# Patient Record
Sex: MALE | Race: WHITE | NOT HISPANIC OR LATINO | URBAN - METROPOLITAN AREA
[De-identification: names, ages, dates, MRNs, and addresses within clinical notes are randomized per-mention and may not be internally consistent; named-entity substitution may affect disease eponyms.]

---

## 2017-02-14 ENCOUNTER — OUTPATIENT (OUTPATIENT)
Dept: OUTPATIENT SERVICES | Facility: HOSPITAL | Age: 82
LOS: 1 days | End: 2017-02-14
Payer: MEDICARE

## 2017-02-14 DIAGNOSIS — Z95.5 PRESENCE OF CORONARY ANGIOPLASTY IMPLANT AND GRAFT: Chronic | ICD-10-CM

## 2017-02-14 DIAGNOSIS — H26.9 UNSPECIFIED CATARACT: Chronic | ICD-10-CM

## 2017-02-14 DIAGNOSIS — K40.90 UNILATERAL INGUINAL HERNIA, WITHOUT OBSTRUCTION OR GANGRENE, NOT SPECIFIED AS RECURRENT: Chronic | ICD-10-CM

## 2017-02-14 PROCEDURE — A9585: CPT

## 2017-02-14 PROCEDURE — 74000: CPT

## 2017-02-14 PROCEDURE — 74183 MRI ABD W/O CNTR FLWD CNTR: CPT

## 2017-02-23 ENCOUNTER — APPOINTMENT (OUTPATIENT)
Dept: HEART AND VASCULAR | Facility: CLINIC | Age: 82
End: 2017-02-23

## 2017-02-23 VITALS — HEART RATE: 72 BPM | SYSTOLIC BLOOD PRESSURE: 120 MMHG | DIASTOLIC BLOOD PRESSURE: 80 MMHG | RESPIRATION RATE: 16 BRPM

## 2017-02-23 VITALS — WEIGHT: 197 LBS | HEIGHT: 69 IN | BODY MASS INDEX: 29.18 KG/M2

## 2017-02-23 DIAGNOSIS — E78.00 PURE HYPERCHOLESTEROLEMIA, UNSPECIFIED: ICD-10-CM

## 2017-02-23 DIAGNOSIS — Z00.00 ENCOUNTER FOR GENERAL ADULT MEDICAL EXAMINATION W/OUT ABNORMAL FINDINGS: ICD-10-CM

## 2017-02-23 DIAGNOSIS — Z87.891 PERSONAL HISTORY OF NICOTINE DEPENDENCE: ICD-10-CM

## 2017-02-23 DIAGNOSIS — E11.9 TYPE 2 DIABETES MELLITUS W/OUT COMPLICATIONS: ICD-10-CM

## 2017-02-23 DIAGNOSIS — M10.9 GOUT, UNSPECIFIED: ICD-10-CM

## 2017-02-23 DIAGNOSIS — R07.9 CHEST PAIN, UNSPECIFIED: ICD-10-CM

## 2017-02-23 DIAGNOSIS — I10 ESSENTIAL (PRIMARY) HYPERTENSION: ICD-10-CM

## 2017-02-23 RX ORDER — METFORMIN HYDROCHLORIDE 500 MG/1
500 TABLET, COATED ORAL
Refills: 0 | Status: ACTIVE | COMMUNITY

## 2017-02-23 RX ORDER — ROSUVASTATIN CALCIUM 5 MG/1
5 TABLET, FILM COATED ORAL
Refills: 0 | Status: ACTIVE | COMMUNITY

## 2017-02-23 RX ORDER — BISMUTH SUBSALICYLATE 525 MG/1
TABLET ORAL
Refills: 0 | Status: ACTIVE | COMMUNITY

## 2017-02-23 RX ORDER — CHOLECALCIFEROL (VITAMIN D3) 25 MCG
TABLET ORAL
Refills: 0 | Status: ACTIVE | COMMUNITY

## 2017-02-23 RX ORDER — COLCHICINE 0.6 MG/1
0.6 CAPSULE ORAL
Refills: 0 | Status: ACTIVE | COMMUNITY

## 2017-02-23 RX ORDER — CLOPIDOGREL 75 MG/1
75 TABLET, FILM COATED ORAL
Refills: 0 | Status: ACTIVE | COMMUNITY

## 2017-02-23 RX ORDER — AMLODIPINE BESYLATE 5 MG/1
TABLET ORAL
Refills: 0 | Status: ACTIVE | COMMUNITY

## 2017-02-23 RX ORDER — TAMSULOSIN HYDROCHLORIDE 0.4 MG/1
0.4 CAPSULE ORAL
Refills: 0 | Status: ACTIVE | COMMUNITY

## 2017-02-23 RX ORDER — PANTOPRAZOLE SODIUM 40 MG/10ML
40 INJECTION, POWDER, FOR SOLUTION INTRAVENOUS
Refills: 0 | Status: ACTIVE | COMMUNITY

## 2017-02-23 RX ORDER — FUROSEMIDE 40 MG/1
40 TABLET ORAL
Refills: 0 | Status: ACTIVE | COMMUNITY

## 2017-02-23 RX ORDER — LOSARTAN POTASSIUM 100 MG/1
100 TABLET, FILM COATED ORAL
Refills: 0 | Status: ACTIVE | COMMUNITY

## 2017-03-01 ENCOUNTER — FORM ENCOUNTER (OUTPATIENT)
Age: 82
End: 2017-03-01

## 2017-03-02 ENCOUNTER — OUTPATIENT (OUTPATIENT)
Dept: OUTPATIENT SERVICES | Facility: HOSPITAL | Age: 82
LOS: 1 days | End: 2017-03-02
Payer: MEDICARE

## 2017-03-02 DIAGNOSIS — Z95.5 PRESENCE OF CORONARY ANGIOPLASTY IMPLANT AND GRAFT: Chronic | ICD-10-CM

## 2017-03-02 DIAGNOSIS — K40.90 UNILATERAL INGUINAL HERNIA, WITHOUT OBSTRUCTION OR GANGRENE, NOT SPECIFIED AS RECURRENT: Chronic | ICD-10-CM

## 2017-03-02 DIAGNOSIS — H26.9 UNSPECIFIED CATARACT: Chronic | ICD-10-CM

## 2017-03-02 PROCEDURE — 75574 CT ANGIO HRT W/3D IMAGE: CPT | Mod: 26

## 2017-03-02 PROCEDURE — 75574 CT ANGIO HRT W/3D IMAGE: CPT

## 2019-01-04 ENCOUNTER — OUTPATIENT (OUTPATIENT)
Dept: OUTPATIENT SERVICES | Facility: HOSPITAL | Age: 84
LOS: 1 days | End: 2019-01-04
Payer: MEDICARE

## 2019-01-04 DIAGNOSIS — K40.90 UNILATERAL INGUINAL HERNIA, WITHOUT OBSTRUCTION OR GANGRENE, NOT SPECIFIED AS RECURRENT: Chronic | ICD-10-CM

## 2019-01-04 DIAGNOSIS — I25.10 ATHEROSCLEROTIC HEART DISEASE OF NATIVE CORONARY ARTERY WITHOUT ANGINA PECTORIS: ICD-10-CM

## 2019-01-04 DIAGNOSIS — H26.9 UNSPECIFIED CATARACT: Chronic | ICD-10-CM

## 2019-01-04 DIAGNOSIS — Z95.5 PRESENCE OF CORONARY ANGIOPLASTY IMPLANT AND GRAFT: Chronic | ICD-10-CM

## 2019-01-04 PROCEDURE — 78452 HT MUSCLE IMAGE SPECT MULT: CPT

## 2019-01-04 PROCEDURE — A9500: CPT

## 2019-01-04 PROCEDURE — 93306 TTE W/DOPPLER COMPLETE: CPT

## 2019-01-04 PROCEDURE — 93018 CV STRESS TEST I&R ONLY: CPT

## 2019-01-04 PROCEDURE — 93016 CV STRESS TEST SUPVJ ONLY: CPT

## 2019-01-04 PROCEDURE — 78452 HT MUSCLE IMAGE SPECT MULT: CPT | Mod: 26

## 2019-01-04 PROCEDURE — 93017 CV STRESS TEST TRACING ONLY: CPT

## 2019-01-04 PROCEDURE — A9505: CPT

## 2019-01-04 PROCEDURE — 93306 TTE W/DOPPLER COMPLETE: CPT | Mod: 26

## 2020-01-24 ENCOUNTER — INPATIENT (INPATIENT)
Facility: HOSPITAL | Age: 85
LOS: 1 days | Discharge: HOME | DRG: 395 | End: 2020-01-26
Attending: SURGERY | Admitting: SURGERY
Payer: MEDICARE

## 2020-01-24 VITALS
WEIGHT: 205.91 LBS | HEART RATE: 95 BPM | DIASTOLIC BLOOD PRESSURE: 82 MMHG | TEMPERATURE: 98 F | OXYGEN SATURATION: 94 % | RESPIRATION RATE: 18 BRPM | SYSTOLIC BLOOD PRESSURE: 162 MMHG

## 2020-01-24 DIAGNOSIS — Z95.5 PRESENCE OF CORONARY ANGIOPLASTY IMPLANT AND GRAFT: Chronic | ICD-10-CM

## 2020-01-24 DIAGNOSIS — H26.9 UNSPECIFIED CATARACT: Chronic | ICD-10-CM

## 2020-01-24 DIAGNOSIS — K40.90 UNILATERAL INGUINAL HERNIA, WITHOUT OBSTRUCTION OR GANGRENE, NOT SPECIFIED AS RECURRENT: Chronic | ICD-10-CM

## 2020-01-24 LAB
ALBUMIN SERPL ELPH-MCNC: 4.2 G/DL — SIGNIFICANT CHANGE UP (ref 3.3–5)
ALP SERPL-CCNC: 65 U/L — SIGNIFICANT CHANGE UP (ref 40–120)
ALT FLD-CCNC: 16 U/L — SIGNIFICANT CHANGE UP (ref 10–45)
ANION GAP SERPL CALC-SCNC: 12 MMOL/L — SIGNIFICANT CHANGE UP (ref 5–17)
APPEARANCE UR: CLEAR — SIGNIFICANT CHANGE UP
APTT BLD: 26.3 SEC — LOW (ref 27.5–36.3)
AST SERPL-CCNC: 22 U/L — SIGNIFICANT CHANGE UP (ref 10–40)
BASOPHILS # BLD AUTO: 0.03 K/UL — SIGNIFICANT CHANGE UP (ref 0–0.2)
BASOPHILS NFR BLD AUTO: 0.4 % — SIGNIFICANT CHANGE UP (ref 0–2)
BILIRUB SERPL-MCNC: 0.9 MG/DL — SIGNIFICANT CHANGE UP (ref 0.2–1.2)
BILIRUB UR-MCNC: NEGATIVE — SIGNIFICANT CHANGE UP
BLD GP AB SCN SERPL QL: NEGATIVE — SIGNIFICANT CHANGE UP
BUN SERPL-MCNC: 15 MG/DL — SIGNIFICANT CHANGE UP (ref 7–23)
CALCIUM SERPL-MCNC: 9.1 MG/DL — SIGNIFICANT CHANGE UP (ref 8.4–10.5)
CHLORIDE SERPL-SCNC: 100 MMOL/L — SIGNIFICANT CHANGE UP (ref 96–108)
CO2 SERPL-SCNC: 23 MMOL/L — SIGNIFICANT CHANGE UP (ref 22–31)
COLOR SPEC: YELLOW — SIGNIFICANT CHANGE UP
CREAT SERPL-MCNC: 0.92 MG/DL — SIGNIFICANT CHANGE UP (ref 0.5–1.3)
DIFF PNL FLD: NEGATIVE — SIGNIFICANT CHANGE UP
EOSINOPHIL # BLD AUTO: 0.07 K/UL — SIGNIFICANT CHANGE UP (ref 0–0.5)
EOSINOPHIL NFR BLD AUTO: 0.9 % — SIGNIFICANT CHANGE UP (ref 0–6)
GLUCOSE BLDC GLUCOMTR-MCNC: 97 MG/DL — SIGNIFICANT CHANGE UP (ref 70–99)
GLUCOSE SERPL-MCNC: 114 MG/DL — HIGH (ref 70–99)
GLUCOSE UR QL: NEGATIVE — SIGNIFICANT CHANGE UP
HCT VFR BLD CALC: 39.6 % — SIGNIFICANT CHANGE UP (ref 39–50)
HCT VFR BLD CALC: 41.4 % — SIGNIFICANT CHANGE UP (ref 39–50)
HGB BLD-MCNC: 13.1 G/DL — SIGNIFICANT CHANGE UP (ref 13–17)
HGB BLD-MCNC: 13.6 G/DL — SIGNIFICANT CHANGE UP (ref 13–17)
IMM GRANULOCYTES NFR BLD AUTO: 0.3 % — SIGNIFICANT CHANGE UP (ref 0–1.5)
INR BLD: 1.16 — SIGNIFICANT CHANGE UP (ref 0.88–1.16)
KETONES UR-MCNC: NEGATIVE — SIGNIFICANT CHANGE UP
LACTATE SERPL-SCNC: 1.9 MMOL/L — SIGNIFICANT CHANGE UP (ref 0.5–2)
LEUKOCYTE ESTERASE UR-ACNC: NEGATIVE — SIGNIFICANT CHANGE UP
LYMPHOCYTES # BLD AUTO: 0.59 K/UL — LOW (ref 1–3.3)
LYMPHOCYTES # BLD AUTO: 8 % — LOW (ref 13–44)
MCHC RBC-ENTMCNC: 28.4 PG — SIGNIFICANT CHANGE UP (ref 27–34)
MCHC RBC-ENTMCNC: 28.4 PG — SIGNIFICANT CHANGE UP (ref 27–34)
MCHC RBC-ENTMCNC: 32.9 GM/DL — SIGNIFICANT CHANGE UP (ref 32–36)
MCHC RBC-ENTMCNC: 33.1 GM/DL — SIGNIFICANT CHANGE UP (ref 32–36)
MCV RBC AUTO: 85.7 FL — SIGNIFICANT CHANGE UP (ref 80–100)
MCV RBC AUTO: 86.4 FL — SIGNIFICANT CHANGE UP (ref 80–100)
MONOCYTES # BLD AUTO: 0.66 K/UL — SIGNIFICANT CHANGE UP (ref 0–0.9)
MONOCYTES NFR BLD AUTO: 9 % — SIGNIFICANT CHANGE UP (ref 2–14)
NEUTROPHILS # BLD AUTO: 6 K/UL — SIGNIFICANT CHANGE UP (ref 1.8–7.4)
NEUTROPHILS NFR BLD AUTO: 81.4 % — HIGH (ref 43–77)
NITRITE UR-MCNC: NEGATIVE — SIGNIFICANT CHANGE UP
NRBC # BLD: 0 /100 WBCS — SIGNIFICANT CHANGE UP (ref 0–0)
NRBC # BLD: 0 /100 WBCS — SIGNIFICANT CHANGE UP (ref 0–0)
PH UR: 6.5 — SIGNIFICANT CHANGE UP (ref 5–8)
PLATELET # BLD AUTO: 201 K/UL — SIGNIFICANT CHANGE UP (ref 150–400)
PLATELET # BLD AUTO: 214 K/UL — SIGNIFICANT CHANGE UP (ref 150–400)
POTASSIUM SERPL-MCNC: 4.7 MMOL/L — SIGNIFICANT CHANGE UP (ref 3.5–5.3)
POTASSIUM SERPL-SCNC: 4.7 MMOL/L — SIGNIFICANT CHANGE UP (ref 3.5–5.3)
PROT SERPL-MCNC: 7.3 G/DL — SIGNIFICANT CHANGE UP (ref 6–8.3)
PROT UR-MCNC: NEGATIVE MG/DL — SIGNIFICANT CHANGE UP
PROTHROM AB SERPL-ACNC: 13.3 SEC — HIGH (ref 10–12.9)
RBC # BLD: 4.62 M/UL — SIGNIFICANT CHANGE UP (ref 4.2–5.8)
RBC # BLD: 4.79 M/UL — SIGNIFICANT CHANGE UP (ref 4.2–5.8)
RBC # FLD: 13.7 % — SIGNIFICANT CHANGE UP (ref 10.3–14.5)
RBC # FLD: 13.9 % — SIGNIFICANT CHANGE UP (ref 10.3–14.5)
RH IG SCN BLD-IMP: POSITIVE — SIGNIFICANT CHANGE UP
SODIUM SERPL-SCNC: 135 MMOL/L — SIGNIFICANT CHANGE UP (ref 135–145)
SP GR SPEC: 1.01 — SIGNIFICANT CHANGE UP (ref 1–1.03)
UROBILINOGEN FLD QL: 0.2 E.U./DL — SIGNIFICANT CHANGE UP
WBC # BLD: 7.27 K/UL — SIGNIFICANT CHANGE UP (ref 3.8–10.5)
WBC # BLD: 7.37 K/UL — SIGNIFICANT CHANGE UP (ref 3.8–10.5)
WBC # FLD AUTO: 7.27 K/UL — SIGNIFICANT CHANGE UP (ref 3.8–10.5)
WBC # FLD AUTO: 7.37 K/UL — SIGNIFICANT CHANGE UP (ref 3.8–10.5)

## 2020-01-24 PROCEDURE — 74174 CTA ABD&PLVS W/CONTRAST: CPT | Mod: 26

## 2020-01-24 PROCEDURE — 99285 EMERGENCY DEPT VISIT HI MDM: CPT

## 2020-01-24 RX ORDER — AMLODIPINE BESYLATE 2.5 MG/1
2.5 TABLET ORAL DAILY
Refills: 0 | Status: DISCONTINUED | OUTPATIENT
Start: 2020-01-24 | End: 2020-01-25

## 2020-01-24 RX ORDER — DEXTROSE 50 % IN WATER 50 %
12.5 SYRINGE (ML) INTRAVENOUS ONCE
Refills: 0 | Status: DISCONTINUED | OUTPATIENT
Start: 2020-01-24 | End: 2020-01-26

## 2020-01-24 RX ORDER — HYDROMORPHONE HYDROCHLORIDE 2 MG/ML
0.5 INJECTION INTRAMUSCULAR; INTRAVENOUS; SUBCUTANEOUS EVERY 6 HOURS
Refills: 0 | Status: DISCONTINUED | OUTPATIENT
Start: 2020-01-24 | End: 2020-01-26

## 2020-01-24 RX ORDER — TAMSULOSIN HYDROCHLORIDE 0.4 MG/1
1 CAPSULE ORAL
Qty: 0 | Refills: 0 | DISCHARGE

## 2020-01-24 RX ORDER — LOSARTAN POTASSIUM 100 MG/1
100 TABLET, FILM COATED ORAL DAILY
Refills: 0 | Status: DISCONTINUED | OUTPATIENT
Start: 2020-01-24 | End: 2020-01-26

## 2020-01-24 RX ORDER — DEXTROSE 50 % IN WATER 50 %
15 SYRINGE (ML) INTRAVENOUS ONCE
Refills: 0 | Status: DISCONTINUED | OUTPATIENT
Start: 2020-01-24 | End: 2020-01-26

## 2020-01-24 RX ORDER — PANTOPRAZOLE SODIUM 20 MG/1
40 TABLET, DELAYED RELEASE ORAL DAILY
Refills: 0 | Status: DISCONTINUED | OUTPATIENT
Start: 2020-01-24 | End: 2020-01-26

## 2020-01-24 RX ORDER — DEXTROSE 50 % IN WATER 50 %
25 SYRINGE (ML) INTRAVENOUS ONCE
Refills: 0 | Status: DISCONTINUED | OUTPATIENT
Start: 2020-01-24 | End: 2020-01-26

## 2020-01-24 RX ORDER — LANOLIN ALCOHOL/MO/W.PET/CERES
5 CREAM (GRAM) TOPICAL AT BEDTIME
Refills: 0 | Status: DISCONTINUED | OUTPATIENT
Start: 2020-01-24 | End: 2020-01-26

## 2020-01-24 RX ORDER — SODIUM CHLORIDE 9 MG/ML
1000 INJECTION, SOLUTION INTRAVENOUS
Refills: 0 | Status: DISCONTINUED | OUTPATIENT
Start: 2020-01-24 | End: 2020-01-26

## 2020-01-24 RX ORDER — LOSARTAN POTASSIUM 100 MG/1
1 TABLET, FILM COATED ORAL
Qty: 0 | Refills: 0 | DISCHARGE

## 2020-01-24 RX ORDER — INSULIN LISPRO 100/ML
VIAL (ML) SUBCUTANEOUS
Refills: 0 | Status: DISCONTINUED | OUTPATIENT
Start: 2020-01-24 | End: 2020-01-26

## 2020-01-24 RX ORDER — METRONIDAZOLE 500 MG
500 TABLET ORAL EVERY 8 HOURS
Refills: 0 | Status: DISCONTINUED | OUTPATIENT
Start: 2020-01-24 | End: 2020-01-26

## 2020-01-24 RX ORDER — ONDANSETRON 8 MG/1
4 TABLET, FILM COATED ORAL EVERY 6 HOURS
Refills: 0 | Status: DISCONTINUED | OUTPATIENT
Start: 2020-01-24 | End: 2020-01-26

## 2020-01-24 RX ORDER — CIPROFLOXACIN LACTATE 400MG/40ML
400 VIAL (ML) INTRAVENOUS EVERY 12 HOURS
Refills: 0 | Status: DISCONTINUED | OUTPATIENT
Start: 2020-01-24 | End: 2020-01-26

## 2020-01-24 RX ORDER — LABETALOL HCL 100 MG
10 TABLET ORAL ONCE
Refills: 0 | Status: COMPLETED | OUTPATIENT
Start: 2020-01-24 | End: 2020-01-24

## 2020-01-24 RX ORDER — GLUCAGON INJECTION, SOLUTION 0.5 MG/.1ML
1 INJECTION, SOLUTION SUBCUTANEOUS ONCE
Refills: 0 | Status: DISCONTINUED | OUTPATIENT
Start: 2020-01-24 | End: 2020-01-26

## 2020-01-24 RX ORDER — SODIUM CHLORIDE 9 MG/ML
1000 INJECTION INTRAMUSCULAR; INTRAVENOUS; SUBCUTANEOUS
Refills: 0 | Status: DISCONTINUED | OUTPATIENT
Start: 2020-01-24 | End: 2020-01-26

## 2020-01-24 RX ORDER — METRONIDAZOLE 500 MG
TABLET ORAL
Refills: 0 | Status: DISCONTINUED | OUTPATIENT
Start: 2020-01-24 | End: 2020-01-24

## 2020-01-24 RX ORDER — ACETAMINOPHEN 500 MG
1000 TABLET ORAL ONCE
Refills: 0 | Status: DISCONTINUED | OUTPATIENT
Start: 2020-01-24 | End: 2020-01-26

## 2020-01-24 RX ADMIN — Medication 100 MILLIGRAM(S): at 17:06

## 2020-01-24 RX ADMIN — Medication 10 MILLIGRAM(S): at 19:42

## 2020-01-24 RX ADMIN — SODIUM CHLORIDE 120 MILLILITER(S): 9 INJECTION INTRAMUSCULAR; INTRAVENOUS; SUBCUTANEOUS at 21:27

## 2020-01-24 RX ADMIN — Medication 200 MILLIGRAM(S): at 19:14

## 2020-01-24 RX ADMIN — Medication 5 MILLIGRAM(S): at 21:53

## 2020-01-24 NOTE — ED ADULT NURSE NOTE - PSH
Cataracts, bilateral  prior extraction  Hernia, inguinal, right  repair  Stented coronary artery  2009

## 2020-01-24 NOTE — ED ADULT TRIAGE NOTE - CHIEF COMPLAINT QUOTE
86 y/o male came in for evaluation of rectal bleeding today. 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

## 2020-01-24 NOTE — H&P ADULT - NSHPPHYSICALEXAM_GEN_ALL_CORE
ICU Vital Signs Last 24 Hrs  T(C): 36.8 (24 Jan 2020 13:45), Max: 36.8 (24 Jan 2020 13:45)  T(F): 98.3 (24 Jan 2020 13:45), Max: 98.3 (24 Jan 2020 13:45)  HR: 70 (24 Jan 2020 13:45) (70 - 95)  BP: 187/88 (24 Jan 2020 13:45) (162/82 - 187/88)  BP(mean): --  ABP: --  ABP(mean): --  RR: 18 (24 Jan 2020 13:45) (18 - 18)  SpO2: 94% (24 Jan 2020 13:45) (94% - 94%)    GEN: NAD, awake, eyes open spontaneously  HEENT: NCAT, MMM, Trachea midline, normal conjunctiva, perrl  CHEST/LUNGS: Nonlaboured breathing, CTAB, bilateral breath sounds  CARDIAC: RRR, no m/r/g  ABDOMEN: Soft, Nontender, Nondistended, No rebound/guarding. YURY: Emptying rectum, some blood on finger, no masses or haemorrhoids  MSK: No oedema, no gross deformity of extremities  SKIN: No rashes, no petechiae, no vesicles  NEURO: CN grossly intact, normal coordination, no focal motor or sensory deficits  PSYCH: Alert, appropriate, cooperative, with capacity and insight

## 2020-01-24 NOTE — ED ADULT NURSE NOTE - OBJECTIVE STATEMENT
86 y/o male presents to ED accompanied by wife and daughters reporting diarrhea x5 since last night with associated lower abdominal pain and one episode of bright red rectal bleeding this AM. Denies nausea/vomiting, fevers/chills. With hx ischemic colitis. On aspirin and plavix.

## 2020-01-24 NOTE — CONSULT NOTE ADULT - SUBJECTIVE AND OBJECTIVE BOX
GASTROENTEROLOGY CONSULT NOTE  Patient is an 86yo M with a PMHx of HTN, HLD, DM2, CAD s/p stenting on ASA and Plavix, hx of ischemic colitis in 2014, diverticulosis, presenting with multiple episodes of diarrhea and one episode of hematochezia. Patient/patient's daughter iterate history of increased fiber intake recently leading to multiple bowel movements with four episodes of brown watery diarrhea overnight leading to one episode with scant BRBPR and one episode of hematochezia. There was some left sided cramping which preceded the episode and improved after BM. Patient stated his pain was an 8/10 at that time, now improved. Patient states his symptoms are very similar to his presentation in 2014. Denies fever or chills, chest pain, palpitations, nausea, or vomiting. Last colonoscopy roughly 3 years ago notable only for diverticulosis.    Allergies    penicillin (Anaphylaxis; Angioedema)    Intolerances      Home Medications:  amLODIPine 2.5 mg oral tablet: 1 tab(s) orally once a day (24 Jan 2020 15:24)  aspirin 81 mg oral delayed release tablet: 1 tab(s) orally once a day (24 Jan 2020 15:24)  colchicine: 0.5 milligram(s) orally once a day (24 Jan 2020 15:24)  Crestor 5 mg oral tablet: 1 tab(s) orally once a day (at bedtime) (24 Jan 2020 15:24)  Lasix 40 mg oral tablet: 40 milligram(s) orally 3 times a day (24 Jan 2020 15:24)  losartan 100 mg oral tablet: 1 tab(s) orally once a day (24 Jan 2020 15:24)  metformin 500 mg oral tablet: 1 tab(s) orally once a day (at bedtime) (24 Jan 2020 15:24)  Plavix 75 mg oral tablet: 1 tab(s) orally once a day (24 Jan 2020 15:24)  Protonix 40 mg oral delayed release tablet: 1 tab(s) orally once a day (24 Jan 2020 15:24)  tamsulosin 0.4 mg oral capsule: 1 cap(s) orally once a day (24 Jan 2020 15:24)    MEDICATIONS:  MEDICATIONS  (STANDING):  ciprofloxacin   IVPB 400 milliGRAM(s) IV Intermittent every 12 hours  dextrose 5%. 1000 milliLiter(s) (50 mL/Hr) IV Continuous <Continuous>  dextrose 50% Injectable 12.5 Gram(s) IV Push once  dextrose 50% Injectable 25 Gram(s) IV Push once  dextrose 50% Injectable 25 Gram(s) IV Push once  insulin lispro (HumaLOG) corrective regimen sliding scale   SubCutaneous Before meals and at bedtime  metroNIDAZOLE  IVPB 500 milliGRAM(s) IV Intermittent every 8 hours  sodium chloride 0.9%. 1000 milliLiter(s) (120 mL/Hr) IV Continuous <Continuous>    MEDICATIONS  (PRN):  dextrose 40% Gel 15 Gram(s) Oral once PRN Blood Glucose LESS THAN 70 milliGRAM(s)/deciliter  glucagon  Injectable 1 milliGRAM(s) IntraMuscular once PRN Glucose LESS THAN 70 milligrams/deciliter  HYDROmorphone  Injectable 0.5 milliGRAM(s) IV Push every 6 hours PRN Severe Pain (7 - 10)  ondansetron Injectable 4 milliGRAM(s) IV Push every 6 hours PRN Nausea and/or Vomiting    PAST MEDICAL & SURGICAL HISTORY:  Diverticulitis  Diabetes mellitus type 2, noninsulin dependent  CAD (coronary artery disease)  HTN (hypertension)  Cataracts, bilateral: prior extraction  Hernia, inguinal, right: repair  Stented coronary artery: 2009    FAMILY HISTORY:    SOCIAL HISTORY:  Tobacco:   Alcohol:  Illicit Drugs:    REVIEW OF SYSTEMS:  CONSTITUTIONAL: No weakness, fevers or chills  HEENT: No visual changes; No vertigo or throat pain   NECK: No pain or stiffness  RESPIRATORY: No cough, wheezing, hemoptysis; No shortness of breath  CARDIOVASCULAR: No chest pain or palpitations  GASTROINTESTINAL: As above  GENITOURINARY: No dysuria, frequency or hematuria  NEUROLOGICAL: No numbness or weakness  SKIN: No itching, burning, rashes, or lesions   All other 10 review of systems is negative unless indicated above.    Vital Signs Last 24 Hrs  T(C): 36.7 (24 Jan 2020 17:12), Max: 36.8 (24 Jan 2020 13:45)  T(F): 98.1 (24 Jan 2020 17:12), Max: 98.3 (24 Jan 2020 13:45)  HR: 72 (24 Jan 2020 17:12) (70 - 95)  BP: 164/82 (24 Jan 2020 17:12) (162/82 - 187/88)  BP(mean): --  RR: 18 (24 Jan 2020 17:12) (18 - 18)  SpO2: 94% (24 Jan 2020 17:12) (94% - 94%)      PHYSICAL EXAM:    General: Well developed; well nourished; in no acute distress  Eyes: Anicteric sclerae, moist conjunctivae  HENT: Moist mucous membranes  Neck: Trachea midline, supple  Lungs: Normal respiratory effort, no intercostal retractions  Cardiovascular: RRR  Abdomen: Mildly tender to palpation in the R and LLQ without rebound or guarding  Extremities: Normal range of motion, No clubbing, cyanosis or edema  Neurological: Alert and oriented x3  Skin: Warm and dry. No obvious rash    LABS:                        13.6   7.37  )-----------( 201      ( 24 Jan 2020 09:47 )             41.4     01-24    135  |  100  |  15  ----------------------------<  114<H>  4.7   |  23  |  0.92    Ca    9.1      24 Jan 2020 09:47    TPro  7.3  /  Alb  4.2  /  TBili  0.9  /  DBili  x   /  AST  22  /  ALT  16  /  AlkPhos  65  01-24        PT/INR - ( 24 Jan 2020 09:47 )   PT: 13.3 sec;   INR: 1.16          PTT - ( 24 Jan 2020 09:47 )  PTT:26.3 sec    RADIOLOGY & ADDITIONAL STUDIES:     Reviewed

## 2020-01-24 NOTE — CONSULT NOTE ADULT - ASSESSMENT
86yo M with a PMHx of HTN, HLD, DM2, CAD s/p stenting on ASA and Plavix, hx of ischemic colitis in 2014, diverticulosis, presenting with multiple episodes of diarrhea and one episode of hematochezia.    1. Hematochezia - Patient with multiple episodes of diarrhea, one of which was preceded by cramping and subsequently produced hematochezia with symptoms similar to episode of ischemic colitis in 2014. CTA showing isolated left-sided colitis highly suspicious for ischemic insult with patent SMA and GARCÍA. Patient is male, but without other high-risk features. Episode likely episode of moderate non-occlusive ischemic colitis.  - Maintain active T&S, large bore IV access  - Transfusion threshold per primary team  - Agree with empiric antibiotic therapy  - Obtain GI PCR to rule out infectious etiologies though appears unlikely    Recommendations discussed with primary team  Case discussed with attending physician

## 2020-01-24 NOTE — H&P ADULT - ASSESSMENT
87M with PMH of HTN, HLD, T2DM, CAD s/p stents in 2009 on ASA and Plavix, ischaemic colitis 2014 treated conservatively, diverticulosis, PSH of open right inguinal hernia repair presents with 1 episode of BRBPR with radiographic evidence of left-sided colitis, likely ischaemic in origin, inflammatory/infectious colitis cannot be ruled out. Pt is haemodynamically stable.    Plan  Admit to surgery team 1, Dr. Bustos, Telemetry  NPO, IVF  Hold ASA and Plavix  GI consult   2 large bore IV  Active type and screen  Serial CBC  DVT PPX, SCD only  Discussed with attending and chief resident 87M with PMH of HTN, HLD, T2DM, CAD s/p stents in 2009 on ASA and Plavix, ischaemic colitis 2014 treated conservatively, diverticulosis, PSH of open right inguinal hernia repair presents with 1 episode of BRBPR with radiographic evidence of left-sided colitis, likely ischaemic in origin, inflammatory/infectious colitis cannot be ruled out. Pt is haemodynamically stable.    Plan  Admit to surgery team 1, Dr. Bustos, Telemetry  NPO, IVF  Hold ASA and Plavix  GI consult   2 large bore IV  Active type and screen  Serial CBC  IV Ceftriaxone and flagyl  DVT PPX, SCD only  Discussed with attending and chief resident 87M with PMH of HTN, HLD, T2DM, CAD s/p stents in 2009 on ASA and Plavix, ischaemic colitis 2014 treated conservatively, diverticulosis, PSH of open right inguinal hernia repair presents with 1 episode of BRBPR with radiographic evidence of left-sided colitis, likely ischaemic in origin, inflammatory/infectious colitis cannot be ruled out. Pt is haemodynamically stable.    Plan  Admit to surgery team 1, Dr. Bustos, Telemetry  NPO, IVF  Hold ASA and Plavix  GI consult   2 large bore IV  Active type and screen  Serial CBC  IV Cipro and flagyl  DVT PPX, SCD only  Discussed with attending and chief resident

## 2020-01-24 NOTE — ED PROVIDER NOTE - CLINICAL SUMMARY MEDICAL DECISION MAKING FREE TEXT BOX
Patient presents to ED with concern for episode of BRBPR, lower abd discomfort today.  Hx of ischemic colitis noted.  On Plavix and ASA.  Labs ordered.  CT abd/pel completed and general surgery team consulted as patient is known to Dr. Bustos.  As per gen sx, patient to be admitted to Dr. Bustos, telemetry.  No further ED orders/recs by surgical team aside from admission at this time.  Patient and family at bedside aware of plan and in agreement.  Will admit at this time.

## 2020-01-24 NOTE — H&P ADULT - NSHPLABSRESULTS_GEN_ALL_CORE
13.6   7.37  )-----------( 201      ( 24 Jan 2020 09:47 )             41.4   01-24    135  |  100  |  15  ----------------------------<  114<H>  4.7   |  23  |  0.92    Ca    9.1      24 Jan 2020 09:47    TPro  7.3  /  Alb  4.2  /  TBili  0.9  /  DBili  x   /  AST  22  /  ALT  16  /  AlkPhos  65  01-24  < from: CT Angio Abdomen and Pelvis w/ IV Cont (01.24.20 @ 13:05) >    IMPRESSION:    Findings consistent with left-sided colitis. Distribution is consistent with ischemic colitis but infectious or inflammatory colitis may have similar appearance. Similar findings were present on the study of 2014.    Patent mesenteric vasculature.    No intraluminal contrast blush to localize the source of active bleeding.    < end of copied text >

## 2020-01-24 NOTE — H&P ADULT - NSICDXPASTMEDICALHX_GEN_ALL_CORE_FT
PAST MEDICAL HISTORY:  CAD (coronary artery disease)     Diabetes mellitus type 2, noninsulin dependent     Diverticulitis     HTN (hypertension)

## 2020-01-24 NOTE — H&P ADULT - HISTORY OF PRESENT ILLNESS
87M with PMH of HTN, HLD, T2DM, CAD s/p stents in 2009 on ASA and Plavix, ischaemic colitis 2014 treated conservative, diverticulosis, PSH of open right inguinal hernia repair presents with 1 episode of BRBPR. At 5:30am pt had an episode of BRBPR, unknown quantity. Denies abd pain, f/c/n/v. Passing flatus, had a 4 episodes of loose diarrhoea yesterday that he attributed to high fiber intake, no blood. Tolerating PO intake. Pt denies dizziness, lightheaded or LOC. Denies CP, palpitations or SOB. Last took ASA and plavix this morning. Last colonoscopy 3 years ago - only diverticulosis. Of note pt had a similar episode of ischaemic colitis in 2014 after presenting with BRBPR, treated conservatively.

## 2020-01-24 NOTE — ED ADULT NURSE NOTE - CHIEF COMPLAINT QUOTE
88 y/o male came in for evaluation of rectal bleeding today. 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

## 2020-01-24 NOTE — ED ADULT NURSE NOTE - PMH
CAD (coronary artery disease)    Diabetes mellitus type 2, noninsulin dependent    Diverticulitis    HTN (hypertension)

## 2020-01-24 NOTE — ED PROVIDER NOTE - OBJECTIVE STATEMENT
87 year old male with history of HTN, HLD, Ischemic Colitis presents to ED with concern for lower abdominal discomfort and bloody bowel movement this morning.  Patient states he had the urge to defecate and when he did "all blood came out."  He notes several episodes of non bloody diarrhea yesterday.  He admits to ischemic colitis managed without surgical intervention "many years ago."  He is on plavix and baby ASA.  Denies history of atrial fibrillation, fever, chills, chest pain, shortness of breath, abdominal pain, nausea, emesis, changes to bowel movements, peripheral edema or any additional acute complaints or concerns at this time.

## 2020-01-24 NOTE — ED PROVIDER NOTE - CCCP TRG CHIEF CMPLNT
[facility] has accepted patient and they are aware he is being discharged to [facility] independent today and will see him for therapy and nursing. FOC signed and placed on chart. Daughter has been called by family and she will be transporting him to [facility] today. rectal bleeding

## 2020-01-24 NOTE — H&P ADULT - ATTENDING COMMENTS
Patient seen and examined, CT reviewed.  No further bleeding, formed BM in ED.  No significant abdominal pain other than mild cramping with BM    Abd soft, ND NT, mild fullness in LLQ    A/P: Ischemic colitis, mild.  1. NPO, IVF  2. IV antibiotics  3. Hold antiplatelets.  4. Discussed natural history with patient and daughter.

## 2020-01-25 ENCOUNTER — TRANSCRIPTION ENCOUNTER (OUTPATIENT)
Age: 85
End: 2020-01-25

## 2020-01-25 LAB
ANION GAP SERPL CALC-SCNC: 10 MMOL/L — SIGNIFICANT CHANGE UP (ref 5–17)
BLD GP AB SCN SERPL QL: NEGATIVE — SIGNIFICANT CHANGE UP
BUN SERPL-MCNC: 13 MG/DL — SIGNIFICANT CHANGE UP (ref 7–23)
CALCIUM SERPL-MCNC: 8.4 MG/DL — SIGNIFICANT CHANGE UP (ref 8.4–10.5)
CHLORIDE SERPL-SCNC: 105 MMOL/L — SIGNIFICANT CHANGE UP (ref 96–108)
CO2 SERPL-SCNC: 22 MMOL/L — SIGNIFICANT CHANGE UP (ref 22–31)
CREAT SERPL-MCNC: 1.02 MG/DL — SIGNIFICANT CHANGE UP (ref 0.5–1.3)
GLUCOSE BLDC GLUCOMTR-MCNC: 109 MG/DL — HIGH (ref 70–99)
GLUCOSE BLDC GLUCOMTR-MCNC: 80 MG/DL — SIGNIFICANT CHANGE UP (ref 70–99)
GLUCOSE BLDC GLUCOMTR-MCNC: 91 MG/DL — SIGNIFICANT CHANGE UP (ref 70–99)
GLUCOSE BLDC GLUCOMTR-MCNC: 98 MG/DL — SIGNIFICANT CHANGE UP (ref 70–99)
GLUCOSE BLDC GLUCOMTR-MCNC: 98 MG/DL — SIGNIFICANT CHANGE UP (ref 70–99)
GLUCOSE SERPL-MCNC: 102 MG/DL — HIGH (ref 70–99)
HBA1C BLD-MCNC: 5.9 % — HIGH (ref 4–5.6)
HCT VFR BLD CALC: 38.5 % — LOW (ref 39–50)
HGB BLD-MCNC: 12.6 G/DL — LOW (ref 13–17)
MAGNESIUM SERPL-MCNC: 1.9 MG/DL — SIGNIFICANT CHANGE UP (ref 1.6–2.6)
MCHC RBC-ENTMCNC: 28.6 PG — SIGNIFICANT CHANGE UP (ref 27–34)
MCHC RBC-ENTMCNC: 32.7 GM/DL — SIGNIFICANT CHANGE UP (ref 32–36)
MCV RBC AUTO: 87.5 FL — SIGNIFICANT CHANGE UP (ref 80–100)
NRBC # BLD: 0 /100 WBCS — SIGNIFICANT CHANGE UP (ref 0–0)
PHOSPHATE SERPL-MCNC: 3.5 MG/DL — SIGNIFICANT CHANGE UP (ref 2.5–4.5)
PLATELET # BLD AUTO: 191 K/UL — SIGNIFICANT CHANGE UP (ref 150–400)
POTASSIUM SERPL-MCNC: 4.2 MMOL/L — SIGNIFICANT CHANGE UP (ref 3.5–5.3)
POTASSIUM SERPL-SCNC: 4.2 MMOL/L — SIGNIFICANT CHANGE UP (ref 3.5–5.3)
RBC # BLD: 4.4 M/UL — SIGNIFICANT CHANGE UP (ref 4.2–5.8)
RBC # FLD: 14.1 % — SIGNIFICANT CHANGE UP (ref 10.3–14.5)
RH IG SCN BLD-IMP: POSITIVE — SIGNIFICANT CHANGE UP
SODIUM SERPL-SCNC: 137 MMOL/L — SIGNIFICANT CHANGE UP (ref 135–145)
WBC # BLD: 6.83 K/UL — SIGNIFICANT CHANGE UP (ref 3.8–10.5)
WBC # FLD AUTO: 6.83 K/UL — SIGNIFICANT CHANGE UP (ref 3.8–10.5)

## 2020-01-25 RX ORDER — MAGNESIUM SULFATE 500 MG/ML
1 VIAL (ML) INJECTION ONCE
Refills: 0 | Status: COMPLETED | OUTPATIENT
Start: 2020-01-25 | End: 2020-01-25

## 2020-01-25 RX ORDER — AMLODIPINE BESYLATE 2.5 MG/1
2.5 TABLET ORAL DAILY
Refills: 0 | Status: DISCONTINUED | OUTPATIENT
Start: 2020-01-25 | End: 2020-01-26

## 2020-01-25 RX ORDER — TAMSULOSIN HYDROCHLORIDE 0.4 MG/1
0.4 CAPSULE ORAL AT BEDTIME
Refills: 0 | Status: DISCONTINUED | OUTPATIENT
Start: 2020-01-25 | End: 2020-01-26

## 2020-01-25 RX ADMIN — Medication 100 GRAM(S): at 09:02

## 2020-01-25 RX ADMIN — TAMSULOSIN HYDROCHLORIDE 0.4 MILLIGRAM(S): 0.4 CAPSULE ORAL at 21:22

## 2020-01-25 RX ADMIN — Medication 100 MILLIGRAM(S): at 00:14

## 2020-01-25 RX ADMIN — LOSARTAN POTASSIUM 100 MILLIGRAM(S): 100 TABLET, FILM COATED ORAL at 07:02

## 2020-01-25 RX ADMIN — AMLODIPINE BESYLATE 2.5 MILLIGRAM(S): 2.5 TABLET ORAL at 21:21

## 2020-01-25 RX ADMIN — PANTOPRAZOLE SODIUM 40 MILLIGRAM(S): 20 TABLET, DELAYED RELEASE ORAL at 11:59

## 2020-01-25 RX ADMIN — AMLODIPINE BESYLATE 2.5 MILLIGRAM(S): 2.5 TABLET ORAL at 07:03

## 2020-01-25 RX ADMIN — Medication 5 MILLIGRAM(S): at 21:25

## 2020-01-25 RX ADMIN — Medication 100 MILLIGRAM(S): at 23:56

## 2020-01-25 RX ADMIN — Medication 100 MILLIGRAM(S): at 07:55

## 2020-01-25 RX ADMIN — Medication 200 MILLIGRAM(S): at 17:36

## 2020-01-25 RX ADMIN — Medication 100 MILLIGRAM(S): at 16:15

## 2020-01-25 RX ADMIN — Medication 200 MILLIGRAM(S): at 07:02

## 2020-01-25 NOTE — PROGRESS NOTE ADULT - SUBJECTIVE AND OBJECTIVE BOX
Pt seen and examined at bedside.  Denies fever, chill, chest pain, shortness of breath, abdominal or epigastric pain, nausea, vomiting, hematemesis, diarrhea, constipation, melena, or hematochezia.     Allergies    penicillin (Anaphylaxis; Angioedema)    Intolerances      MEDICATIONS:  MEDICATIONS  (STANDING):  acetaminophen  IVPB .. 1000 milliGRAM(s) IV Intermittent once  amLODIPine   Tablet 2.5 milliGRAM(s) Oral daily  ciprofloxacin   IVPB 400 milliGRAM(s) IV Intermittent every 12 hours  dextrose 5%. 1000 milliLiter(s) (50 mL/Hr) IV Continuous <Continuous>  dextrose 50% Injectable 12.5 Gram(s) IV Push once  dextrose 50% Injectable 25 Gram(s) IV Push once  dextrose 50% Injectable 25 Gram(s) IV Push once  insulin lispro (HumaLOG) corrective regimen sliding scale   SubCutaneous Before meals and at bedtime  losartan 100 milliGRAM(s) Oral daily  metroNIDAZOLE  IVPB 500 milliGRAM(s) IV Intermittent every 8 hours  pantoprazole  Injectable 40 milliGRAM(s) IV Push daily  sodium chloride 0.9%. 1000 milliLiter(s) (50 mL/Hr) IV Continuous <Continuous>    MEDICATIONS  (PRN):  dextrose 40% Gel 15 Gram(s) Oral once PRN Blood Glucose LESS THAN 70 milliGRAM(s)/deciliter  glucagon  Injectable 1 milliGRAM(s) IntraMuscular once PRN Glucose LESS THAN 70 milligrams/deciliter  HYDROmorphone  Injectable 0.5 milliGRAM(s) IV Push every 6 hours PRN Severe Pain (7 - 10)  melatonin 5 milliGRAM(s) Oral at bedtime PRN Insomnia  ondansetron Injectable 4 milliGRAM(s) IV Push every 6 hours PRN Nausea and/or Vomiting    Vital Signs Last 24 Hrs  T(C): 36.4 (2020 08:20), Max: 37 (2020 05:53)  T(F): 97.6 (2020 08:20), Max: 98.6 (2020 05:53)  HR: 60 (2020 08:20) (56 - 73)  BP: 157/81 (2020 08:20) (118/54 - 187/88)  BP(mean): --  RR: 17 (2020 08:20) (17 - 18)  SpO2: 94% (2020 08:20) (94% - 95%)     @ 07: @ 07:00  --------------------------------------------------------  IN: 1300 mL / OUT: 0 mL / NET: 1300 mL     @ 07: @ 12:47  --------------------------------------------------------  IN: 540 mL / OUT: 160 mL / NET: 380 mL      PHYSICAL EXAM:    General: Well developed; well nourished; in no acute distress  HEENT: MMM, conjunctiva and sclera clear  Gastrointestinal: Soft non-tender non-distended; Normal bowel sounds; No hepatosplenomegaly. No rebound or guarding  Skin: Warm and dry. No obvious rash    LABS:                        12.6   6.83  )-----------( 191      ( 2020 06:53 )             38.5         137  |  105  |  13  ----------------------------<  102<H>  4.2   |  22  |  1.02    Ca    8.4      2020 06:53  Phos  3.5       Mg     1.9         TPro  7.3  /  Alb  4.2  /  TBili  0.9  /  DBili  x   /  AST  22  /  ALT  16  /  AlkPhos  65  24    PT/INR - ( 2020 09:47 )   PT: 13.3 sec;   INR: 1.16          PTT - ( 2020 09:47 )  PTT:26.3 sec      Urinalysis Basic - ( 2020 11:05 )    Color: Yellow / Appearance: Clear / S.010 / pH: x  Gluc: x / Ketone: NEGATIVE  / Bili: Negative / Urobili: 0.2 E.U./dL   Blood: x / Protein: NEGATIVE mg/dL / Nitrite: NEGATIVE   Leuk Esterase: NEGATIVE / RBC: x / WBC x   Sq Epi: x / Non Sq Epi: x / Bacteria: x Pt seen and examined at bedside.  Denies fever, chill, chest pain, shortness of breath, abdominal or epigastric pain, nausea, vomiting, hematemesis, diarrhea, constipation. Last BM yesterday.      Allergies    penicillin (Anaphylaxis; Angioedema)    Intolerances      MEDICATIONS:  MEDICATIONS  (STANDING):  acetaminophen  IVPB .. 1000 milliGRAM(s) IV Intermittent once  amLODIPine   Tablet 2.5 milliGRAM(s) Oral daily  ciprofloxacin   IVPB 400 milliGRAM(s) IV Intermittent every 12 hours  dextrose 5%. 1000 milliLiter(s) (50 mL/Hr) IV Continuous <Continuous>  dextrose 50% Injectable 12.5 Gram(s) IV Push once  dextrose 50% Injectable 25 Gram(s) IV Push once  dextrose 50% Injectable 25 Gram(s) IV Push once  insulin lispro (HumaLOG) corrective regimen sliding scale   SubCutaneous Before meals and at bedtime  losartan 100 milliGRAM(s) Oral daily  metroNIDAZOLE  IVPB 500 milliGRAM(s) IV Intermittent every 8 hours  pantoprazole  Injectable 40 milliGRAM(s) IV Push daily  sodium chloride 0.9%. 1000 milliLiter(s) (50 mL/Hr) IV Continuous <Continuous>    MEDICATIONS  (PRN):  dextrose 40% Gel 15 Gram(s) Oral once PRN Blood Glucose LESS THAN 70 milliGRAM(s)/deciliter  glucagon  Injectable 1 milliGRAM(s) IntraMuscular once PRN Glucose LESS THAN 70 milligrams/deciliter  HYDROmorphone  Injectable 0.5 milliGRAM(s) IV Push every 6 hours PRN Severe Pain (7 - 10)  melatonin 5 milliGRAM(s) Oral at bedtime PRN Insomnia  ondansetron Injectable 4 milliGRAM(s) IV Push every 6 hours PRN Nausea and/or Vomiting    Vital Signs Last 24 Hrs  T(C): 36.4 (2020 08:20), Max: 37 (2020 05:53)  T(F): 97.6 (2020 08:20), Max: 98.6 (2020 05:53)  HR: 60 (2020 08:20) (56 - 73)  BP: 157/81 (2020 08:20) (118/54 - 187/88)  BP(mean): --  RR: 17 (2020 08:20) (17 - 18)  SpO2: 94% (2020 08:20) (94% - 95%)     @ 07: @ 07:00  --------------------------------------------------------  IN: 1300 mL / OUT: 0 mL / NET: 1300 mL     @ 07: @ 12:47  --------------------------------------------------------  IN: 540 mL / OUT: 160 mL / NET: 380 mL      PHYSICAL EXAM:    General: Well developed; well nourished; in no acute distress  HEENT: MMM, conjunctiva and sclera clear  Gastrointestinal: Soft non-tender non-distended; Normal bowel sounds; No hepatosplenomegaly. No rebound or guarding  Skin: Warm and dry. No obvious rash    LABS:                        12.6   6.83  )-----------( 191      ( 2020 06:53 )             38.5         137  |  105  |  13  ----------------------------<  102<H>  4.2   |  22  |  1.02    Ca    8.4      2020 06:53  Phos  3.5       Mg     1.9         TPro  7.3  /  Alb  4.2  /  TBili  0.9  /  DBili  x   /  AST  22  /  ALT  16  /  AlkPhos  65  24    PT/INR - ( 2020 09:47 )   PT: 13.3 sec;   INR: 1.16          PTT - ( 2020 09:47 )  PTT:26.3 sec      Urinalysis Basic - ( 2020 11:05 )    Color: Yellow / Appearance: Clear / S.010 / pH: x  Gluc: x / Ketone: NEGATIVE  / Bili: Negative / Urobili: 0.2 E.U./dL   Blood: x / Protein: NEGATIVE mg/dL / Nitrite: NEGATIVE   Leuk Esterase: NEGATIVE / RBC: x / WBC x   Sq Epi: x / Non Sq Epi: x / Bacteria: x

## 2020-01-25 NOTE — DISCHARGE NOTE PROVIDER - NSDCFUADDAPPT_GEN_ALL_CORE_FT
Please follow up with Dr. Bustos next week. Call his office to schedule an appointment: [phone] Call the office if you experience increasing pain, nausea, vomiting, swelling, redness, or bright red blood per rectum, temperature >100.4F.

## 2020-01-25 NOTE — DISCHARGE NOTE PROVIDER - HOSPITAL COURSE
87M with PMH of HTN, HLD, T2DM, CAD s/p stents in 2009 on ASA and Plavix, ischaemic colitis 2014 treated conservative, diverticulosis, PSH of open right inguinal hernia repair presents with 1 episode of BRBPR. The morning before the patient arrived, pt had an episode of BRBPR, unknown quantity. Denied abd pain, f/c/n/v. Passing flatus, had a 4 episodes of loose diarrhea the day prior that he attributed to high fiber intake, no blood. Patient was tolerating PO intake. The patient last took ASA and plavix just before he arrived to the ED. Last colonoscopy was 3 years ago - only diverticulosis. The patient started passing flatus and we advanced the diet to clears which he tolerated so we advanced diet again to low residue diet and patient tolerated. 87 year old male with PMH of HTN, HLD, T2DM, CAD s/p stents in 2009 on ASA and Plavix, ischaemic colitis 2014 treated conservative, diverticulosis, PSH of open right inguinal hernia repair presents with 1 episode of BRBPR. The morning before the patient arrived, pt had an episode of BRBPR, unknown quantity. Denied abd pain, f/c/n/v. Passing flatus, had a 4 episodes of loose diarrhea the day prior that he attributed to high fiber intake, no blood. Patient was tolerating PO intake. The patient last took ASA and plavix just before he arrived to the ED. Last colonoscopy was 3 years ago - only diverticulosis. The patient started passing flatus and we advanced the diet to clears which he tolerated so we advanced diet again to low residue diet and patient tolerated.

## 2020-01-25 NOTE — DISCHARGE NOTE PROVIDER - NSDCCPCAREPLAN_GEN_ALL_CORE_FT
PRINCIPAL DISCHARGE DIAGNOSIS  Diagnosis: Colitis  Assessment and Plan of Treatment:       SECONDARY DISCHARGE DIAGNOSES  Diagnosis: Rectal bleeding  Assessment and Plan of Treatment:

## 2020-01-25 NOTE — DISCHARGE NOTE PROVIDER - NSDCFUADDINST_GEN_ALL_CORE_FT
General Discharge Instructions:  Please resume all regular home medications unless specifically advised not to take a particular medication. Also, please take any new medications as prescribed.  Please get plenty of rest, continue to ambulate several times per day, and drink adequate amounts of fluids. Avoid lifting weights greater than 5-10 lbs until you follow-up with your surgeon, who will instruct you further regarding activity restrictions.  Avoid driving or operating heavy machinery while taking pain medications.  Please follow-up with your surgeon and Primary Care Provider (PCP) as advised.  Incision Care:  *Please call your doctor or nurse practitioner if you have increased pain, swelling, redness, or bleeding per rectum  *Avoid swimming and baths until your follow-up appointment.  *You may shower  Warning Signs:  Please call your doctor or nurse practitioner if you experience the following:  *You experience new chest pain, pressure, squeezing or tightness.  *New or worsening cough, shortness of breath, or wheeze.  *If you are vomiting and cannot keep down fluids or your medications.  *You are getting dehydrated due to continued vomiting, diarrhea, or other reasons. Signs of dehydration include dry mouth, rapid heartbeat, or feeling dizzy or faint when standing.  *You see blood or dark/black material when you vomit or have a bowel movement.  *You experience burning when you urinate, have blood in your urine, or experience a discharge.  *Your pain is not improving within 8-12 hours or is not gone within 24 hours. Call or return immediately if your pain is getting worse, changes location, or moves to your chest or back.  *You have shaking chills, or fever greater than 100.4 degrees Fahrenheit or 38 degrees Celsius.  *Any change in your symptoms, or any new symptoms that concern you. -Continue a low residue diet   -Activity: no heavy lifting or strenuous exercise for one month.  -You may shower but no soaking baths, no swimming pools.  -Notify physician for fever greater than 101, worsening abdominal pain bright red blood per rectum  -Follow-up with Dr. Bustos in 1 week. Call the office to make an appointment.  - Please start Aspirin 81 mg tomorrow January 27, 2020.  - Please do not restart Plavix .  - Please take Cefpodoxime 400 mg twice a day by mouth every 12 hours for 3 days.  - Please take Flagyl 500 mg by mouth every 8 hours for 3 days.            General Discharge Instructions:  Please resume all regular home medications unless specifically advised not to take a particular medication. Also, please take any new medications as prescribed.  Please get plenty of rest, continue to ambulate several times per day, and drink adequate amounts of fluids. Avoid lifting weights greater than 5-10 lbs until you follow-up with your surgeon, who will instruct you further regarding activity restrictions.  Avoid driving or operating heavy machinery while taking pain medications.  Please follow-up with your surgeon and Primary Care Provider (PCP) as advised.  Incision Care:  *Please call your doctor or nurse practitioner if you have increased pain, swelling, redness, or bleeding per rectum  *Avoid swimming and baths until your follow-up appointment.  *You may shower  Warning Signs:  Please call your doctor or nurse practitioner if you experience the following:  *You experience new chest pain, pressure, squeezing or tightness.  *New or worsening cough, shortness of breath, or wheeze.  *If you are vomiting and cannot keep down fluids or your medications.  *You are getting dehydrated due to continued vomiting, diarrhea, or other reasons. Signs of dehydration include dry mouth, rapid heartbeat, or feeling dizzy or faint when standing.  *You see blood or dark/black material when you vomit or have a bowel movement.  *You experience burning when you urinate, have blood in your urine, or experience a discharge.  *Your pain is not improving within 8-12 hours or is not gone within 24 hours. Call or return immediately if your pain is getting worse, changes location, or moves to your chest or back.  *You have shaking chills, or fever greater than 100.4 degrees Fahrenheit or 38 degrees Celsius.  *Any change in your symptoms, or any new symptoms that concern you.

## 2020-01-25 NOTE — DISCHARGE NOTE PROVIDER - NSDCCPGOAL_GEN_ALL_CORE_FT
To get better and follow your care plan as instructed. To get better and follow your care plan as instructed.  -Continue a low residue diet   -Activity: no heavy lifting or strenuous exercise for one month.  -You may shower but no soaking baths, no swimming pools.  -Notify physician for fever greater than 101, worsening abdominal pain bright red blood per rectum  -Follow-up with Dr. Bustos in 1 week. Call the office to make an appointment.  - Please start Aspirin 81 mg tomorrow January 27, 2020.  - Please do not restart Plavix .  - Please take Cefpodoxime 400 mg twice a day by mouth every 12 hours for 3 days.  - Please take Flagyl 500 mg by mouth every 8 hours for 3 days.

## 2020-01-25 NOTE — DISCHARGE NOTE PROVIDER - CARE PROVIDER_API CALL
Kyrie Bustos)  ColonRectal Surgery; Surgery  [address] [place] [address], [address]  [place] [address]  Phone: [phone]  Fax: [phone]  Follow Up Time:

## 2020-01-25 NOTE — DISCHARGE NOTE PROVIDER - NSDCMRMEDTOKEN_GEN_ALL_CORE_FT
amLODIPine 2.5 mg oral tablet: 1 tab(s) orally once a day  aspirin 81 mg oral delayed release tablet: 1 tab(s) orally once a day  colchicine: 0.5 milligram(s) orally once a day  Crestor 5 mg oral tablet: 1 tab(s) orally once a day (at bedtime)  Lasix 40 mg oral tablet: 40 milligram(s) orally 3 times a day  losartan 100 mg oral tablet: 1 tab(s) orally once a day  metformin 500 mg oral tablet: 1 tab(s) orally once a day (at bedtime)  Plavix 75 mg oral tablet: 1 tab(s) orally once a day  Protonix 40 mg oral delayed release tablet: 1 tab(s) orally once a day  tamsulosin 0.4 mg oral capsule: 1 cap(s) orally once a day amLODIPine 2.5 mg oral tablet: 1 tab(s) orally once a day  aspirin 81 mg oral delayed release tablet: 1 tab(s) orally once a day  cefpodoxime 200 mg oral tablet: 1 tab(s) orally every 12 hours MDD:2 tablets  colchicine: 0.5 milligram(s) orally once a day  Crestor 5 mg oral tablet: 1 tab(s) orally once a day (at bedtime)  Flagyl 500 mg oral tablet: 1 tab(s) orally every 8 hours MDD:3 tablets  losartan 100 mg oral tablet: 1 tab(s) orally once a day  metformin 500 mg oral tablet: 1 tab(s) orally once a day (at bedtime)  Protonix 40 mg oral delayed release tablet: 1 tab(s) orally once a day  tamsulosin 0.4 mg oral capsule: 1 cap(s) orally once a day

## 2020-01-25 NOTE — PROGRESS NOTE ADULT - ASSESSMENT
88yo M with a PMHx of HTN, HLD, DM2, CAD s/p stenting on ASA and Plavix, hx of ischemic colitis in 2014, diverticulosis, presenting with multiple episodes of diarrhea and one episode of hematochezia.    #Hematochezia   Patient with multiple episodes of diarrhea, one of which was preceded by cramping and subsequently produced hematochezia with symptoms similar to episode of ischemic colitis in 2014. CTA showing isolated left-sided colitis highly suspicious for ischemic insult with patent SMA and GARCÍA. Patient is male, but without other high-risk features. Likely an episode of moderate non-occlusive ischemic colitis.  - Maintain active T&S, large bore IV access  - Transfusion threshold per primary team  - C/w empiric antibiotic therapy  - Obtain GI PCR to rule out infectious etiologies though appears unlikely    Case discussed with attending physician 88yo M with a PMHx of HTN, HLD, DM2, CAD s/p stenting on ASA and Plavix, hx of ischemic colitis in 2014, diverticulosis, presenting with multiple episodes of diarrhea and one episode of hematochezia.    #Hematochezia   Patient with multiple episodes of diarrhea, one of which was preceded by cramping and subsequently produced hematochezia with symptoms similar to episode of ischemic colitis in 2014. CTA showing isolated left-sided colitis highly suspicious for ischemic insult with patent SMA and GARCÍA. Patient is male, but without other high-risk features. Likely an episode of moderate non-occlusive ischemic colitis.  - Maintain active T&S, large bore IV access  - Transfusion threshold per primary team  - Can d/c empiric antibiotic   - Outpatient GI followup at discharge with Dr. Louis Quinn    Patient seen and examined with Dr. Hauser

## 2020-01-25 NOTE — PROGRESS NOTE ADULT - SUBJECTIVE AND OBJECTIVE BOX
No pain other than occasional cramps.  No further BM or bleeding.  AFVSS  Abd soft, ND NT    A/P: Ischemic colitis, mild, improving.  1. clear liquids  2. HLIV once tolerating diet  3. OOB, IS  4. Hold ASA, Plavix  5. ceftriaxone, flagyl.

## 2020-01-25 NOTE — PROGRESS NOTE ADULT - SUBJECTIVE AND OBJECTIVE BOX
SUBJECTIVE: Patient seen and examined bedside with chief resident. Patient states no pain other than occasional cramps when he has flatus.  Patient has no further BM or bleeding.     amLODIPine   Tablet 2.5 milliGRAM(s) Oral daily  ciprofloxacin   IVPB 400 milliGRAM(s) IV Intermittent every 12 hours  losartan 100 milliGRAM(s) Oral daily  metroNIDAZOLE  IVPB 500 milliGRAM(s) IV Intermittent every 8 hours      Vital Signs Last 24 Hrs  T(C): 36.4 (2020 08:20), Max: 37 (2020 05:53)  T(F): 97.6 (2020 08:20), Max: 98.6 (2020 05:53)  HR: 60 (2020 08:20) (56 - 73)  BP: 157/81 (2020 08:20) (118/54 - 187/88)  BP(mean): --  RR: 17 (2020 08:20) (17 - 18)  SpO2: 94% (2020 08:20) (94% - 95%)  I&O's Detail    2020 07:01  -  2020 07:00  --------------------------------------------------------  IN:    IV PiggyBack: 100 mL    sodium chloride 0.9%.: 1200 mL  Total IN: 1300 mL    OUT:  Total OUT: 0 mL    Total NET: 1300 mL      2020 07:01  -  2020 09:59  --------------------------------------------------------  IN:  Total IN: 0 mL    OUT:    Voided: 160 mL  Total OUT: 160 mL    Total NET: -160 mL    General: NAD, resting comfortably in bed  C/V: NSR  Pulm: Nonlabored breathing, no respiratory distress  Abd: soft, NT/ND  Extrem: WWP, no edema, SCDs in place    LABS:                        12.6   6.83  )-----------( 191      ( 2020 06:53 )             38.5         137  |  105  |  13  ----------------------------<  102<H>  4.2   |  22  |  1.02    Ca    8.4      2020 06:53  Phos  3.5       Mg     1.9         TPro  7.3  /  Alb  4.2  /  TBili  0.9  /  DBili  x   /  AST  22  /  ALT  16  /  AlkPhos  65  0124    PT/INR - ( 2020 09:47 )   PT: 13.3 sec;   INR: 1.16          PTT - ( 2020 09:47 )  PTT:26.3 sec  Urinalysis Basic - ( 2020 11:05 )    Color: Yellow / Appearance: Clear / S.010 / pH: x  Gluc: x / Ketone: NEGATIVE  / Bili: Negative / Urobili: 0.2 E.U./dL   Blood: x / Protein: NEGATIVE mg/dL / Nitrite: NEGATIVE   Leuk Esterase: NEGATIVE / RBC: x / WBC x   Sq Epi: x / Non Sq Epi: x / Bacteria: x        RADIOLOGY & ADDITIONAL STUDIES:

## 2020-01-25 NOTE — PROGRESS NOTE ADULT - ASSESSMENT
87M with PMH of HTN, HLD, T2DM, CAD s/p stents in 2009 on ASA and Plavix, ischemic colitis 2014 treated conservatively, diverticulosis, PSH of open right inguinal hernia repair presents with 1 episode of BRBPR with radiographic evidence of left-sided colitis, likely ischaemic in origin, inflammatory/infectious colitis cannot be ruled out. Pt is hemodynamically stable.    Adv to CLD, IVF  Hold ASA and Plavix  ISS  Active type and screen, CBCs prn  IV Cipro and flagyl  DVT PPX, SCD only  AM labs  Appreciate GI recs

## 2020-01-26 ENCOUNTER — TRANSCRIPTION ENCOUNTER (OUTPATIENT)
Age: 85
End: 2020-01-26

## 2020-01-26 VITALS
SYSTOLIC BLOOD PRESSURE: 146 MMHG | DIASTOLIC BLOOD PRESSURE: 72 MMHG | OXYGEN SATURATION: 95 % | TEMPERATURE: 98 F | RESPIRATION RATE: 17 BRPM | HEART RATE: 59 BPM

## 2020-01-26 LAB
ANION GAP SERPL CALC-SCNC: 11 MMOL/L — SIGNIFICANT CHANGE UP (ref 5–17)
BUN SERPL-MCNC: 11 MG/DL — SIGNIFICANT CHANGE UP (ref 7–23)
CALCIUM SERPL-MCNC: 8.5 MG/DL — SIGNIFICANT CHANGE UP (ref 8.4–10.5)
CHLORIDE SERPL-SCNC: 104 MMOL/L — SIGNIFICANT CHANGE UP (ref 96–108)
CO2 SERPL-SCNC: 22 MMOL/L — SIGNIFICANT CHANGE UP (ref 22–31)
CREAT SERPL-MCNC: 1.01 MG/DL — SIGNIFICANT CHANGE UP (ref 0.5–1.3)
CULTURE RESULTS: SIGNIFICANT CHANGE UP
GLUCOSE BLDC GLUCOMTR-MCNC: 106 MG/DL — HIGH (ref 70–99)
GLUCOSE BLDC GLUCOMTR-MCNC: 89 MG/DL — SIGNIFICANT CHANGE UP (ref 70–99)
GLUCOSE SERPL-MCNC: 126 MG/DL — HIGH (ref 70–99)
HCT VFR BLD CALC: 36.9 % — LOW (ref 39–50)
HGB BLD-MCNC: 12.5 G/DL — LOW (ref 13–17)
MAGNESIUM SERPL-MCNC: 2 MG/DL — SIGNIFICANT CHANGE UP (ref 1.6–2.6)
MCHC RBC-ENTMCNC: 29.3 PG — SIGNIFICANT CHANGE UP (ref 27–34)
MCHC RBC-ENTMCNC: 33.9 GM/DL — SIGNIFICANT CHANGE UP (ref 32–36)
MCV RBC AUTO: 86.6 FL — SIGNIFICANT CHANGE UP (ref 80–100)
NRBC # BLD: 0 /100 WBCS — SIGNIFICANT CHANGE UP (ref 0–0)
PHOSPHATE SERPL-MCNC: 3.2 MG/DL — SIGNIFICANT CHANGE UP (ref 2.5–4.5)
PLATELET # BLD AUTO: 182 K/UL — SIGNIFICANT CHANGE UP (ref 150–400)
POTASSIUM SERPL-MCNC: 4.2 MMOL/L — SIGNIFICANT CHANGE UP (ref 3.5–5.3)
POTASSIUM SERPL-SCNC: 4.2 MMOL/L — SIGNIFICANT CHANGE UP (ref 3.5–5.3)
RBC # BLD: 4.26 M/UL — SIGNIFICANT CHANGE UP (ref 4.2–5.8)
RBC # FLD: 14.1 % — SIGNIFICANT CHANGE UP (ref 10.3–14.5)
SODIUM SERPL-SCNC: 137 MMOL/L — SIGNIFICANT CHANGE UP (ref 135–145)
SPECIMEN SOURCE: SIGNIFICANT CHANGE UP
WBC # BLD: 7.21 K/UL — SIGNIFICANT CHANGE UP (ref 3.8–10.5)
WBC # FLD AUTO: 7.21 K/UL — SIGNIFICANT CHANGE UP (ref 3.8–10.5)

## 2020-01-26 PROCEDURE — 85730 THROMBOPLASTIN TIME PARTIAL: CPT

## 2020-01-26 PROCEDURE — 36415 COLL VENOUS BLD VENIPUNCTURE: CPT

## 2020-01-26 PROCEDURE — 87507 IADNA-DNA/RNA PROBE TQ 12-25: CPT

## 2020-01-26 PROCEDURE — 83605 ASSAY OF LACTIC ACID: CPT

## 2020-01-26 PROCEDURE — 86901 BLOOD TYPING SEROLOGIC RH(D): CPT

## 2020-01-26 PROCEDURE — 85025 COMPLETE CBC W/AUTO DIFF WBC: CPT

## 2020-01-26 PROCEDURE — 84100 ASSAY OF PHOSPHORUS: CPT

## 2020-01-26 PROCEDURE — 82962 GLUCOSE BLOOD TEST: CPT

## 2020-01-26 PROCEDURE — 80048 BASIC METABOLIC PNL TOTAL CA: CPT

## 2020-01-26 PROCEDURE — 85610 PROTHROMBIN TIME: CPT

## 2020-01-26 PROCEDURE — 99285 EMERGENCY DEPT VISIT HI MDM: CPT

## 2020-01-26 PROCEDURE — 99238 HOSP IP/OBS DSCHRG MGMT 30/<: CPT

## 2020-01-26 PROCEDURE — 83735 ASSAY OF MAGNESIUM: CPT

## 2020-01-26 PROCEDURE — 80053 COMPREHEN METABOLIC PANEL: CPT

## 2020-01-26 PROCEDURE — 83036 HEMOGLOBIN GLYCOSYLATED A1C: CPT

## 2020-01-26 PROCEDURE — 86850 RBC ANTIBODY SCREEN: CPT

## 2020-01-26 PROCEDURE — 81003 URINALYSIS AUTO W/O SCOPE: CPT

## 2020-01-26 PROCEDURE — 85027 COMPLETE CBC AUTOMATED: CPT

## 2020-01-26 PROCEDURE — 74174 CTA ABD&PLVS W/CONTRAST: CPT

## 2020-01-26 RX ORDER — MOXIFLOXACIN HYDROCHLORIDE TABLETS, 400 MG 400 MG/1
1 TABLET, FILM COATED ORAL
Qty: 10 | Refills: 0
Start: 2020-01-26 | End: 2020-01-30

## 2020-01-26 RX ORDER — FUROSEMIDE 40 MG
40 TABLET ORAL
Qty: 0 | Refills: 0 | DISCHARGE

## 2020-01-26 RX ORDER — METRONIDAZOLE 500 MG
1 TABLET ORAL
Qty: 9 | Refills: 0
Start: 2020-01-26 | End: 2020-01-28

## 2020-01-26 RX ORDER — CEFPODOXIME PROXETIL 100 MG
1 TABLET ORAL
Qty: 6 | Refills: 0
Start: 2020-01-26 | End: 2020-01-28

## 2020-01-26 RX ORDER — METRONIDAZOLE 500 MG
1 TABLET ORAL
Qty: 15 | Refills: 0
Start: 2020-01-26 | End: 2020-01-30

## 2020-01-26 RX ADMIN — LOSARTAN POTASSIUM 100 MILLIGRAM(S): 100 TABLET, FILM COATED ORAL at 05:39

## 2020-01-26 RX ADMIN — AMLODIPINE BESYLATE 2.5 MILLIGRAM(S): 2.5 TABLET ORAL at 05:40

## 2020-01-26 RX ADMIN — PANTOPRAZOLE SODIUM 40 MILLIGRAM(S): 20 TABLET, DELAYED RELEASE ORAL at 11:58

## 2020-01-26 RX ADMIN — Medication 200 MILLIGRAM(S): at 05:39

## 2020-01-26 RX ADMIN — Medication 100 MILLIGRAM(S): at 07:28

## 2020-01-26 NOTE — DISCHARGE NOTE NURSING/CASE MANAGEMENT/SOCIAL WORK - PATIENT PORTAL LINK FT
You can access the FollowMyHealth Patient Portal offered by [facility] by registering at the following website: [email or web address]. By joining [employer]’s FollowMyHealth portal, you will also be able to view your health information using other applications (apps) compatible with our system.

## 2020-01-26 NOTE — PROGRESS NOTE ADULT - ASSESSMENT
87M with PMH of HTN, HLD, T2DM, CAD s/p stents in 2009 on ASA and Plavix, ischaemic colitis 2014 treated conservatively, diverticulosis, PSH of open right inguinal hernia repair presents with 1 episode of BRBPR with radiographic evidence of left-sided colitis, likely ischaemic in origin, inflammatory/infectious colitis cannot be ruled out. Pt is haemodynamically stable.    LRD, IVF  Hold ASA and Plavix  ISS  Active type and screen, CBCs prn  IV Cipro and flagyl  DVT PPX, SCD only  AM labs  Discharge with Cipro and Flagyl for 5 days

## 2020-01-26 NOTE — PROGRESS NOTE ADULT - SUBJECTIVE AND OBJECTIVE BOX
Doing well.  Abdomen benign  No further bleeding    Resolving ischemic colitis.  1. d/c home, f/u with Dr. Quinn  2. Cefpodoxime/flagyl for 3 more days.  3. Low residue diet for another week, then liberalize.

## 2020-01-26 NOTE — PROGRESS NOTE ADULT - SUBJECTIVE AND OBJECTIVE BOX
Pt seen and examined at bedside.  Denies fever, chill, chest pain, shortness of breath, abdominal or epigastric pain, nausea, vomiting, hematemesis, diarrhea, constipation, or melena.  Had some dark BM this morning.      Allergies    penicillin (Anaphylaxis; Angioedema)    Intolerances      MEDICATIONS:  MEDICATIONS  (STANDING):  acetaminophen  IVPB .. 1000 milliGRAM(s) IV Intermittent once  amLODIPine   Tablet 2.5 milliGRAM(s) Oral daily  ciprofloxacin   IVPB 400 milliGRAM(s) IV Intermittent every 12 hours  dextrose 5%. 1000 milliLiter(s) (50 mL/Hr) IV Continuous <Continuous>  dextrose 50% Injectable 12.5 Gram(s) IV Push once  dextrose 50% Injectable 25 Gram(s) IV Push once  dextrose 50% Injectable 25 Gram(s) IV Push once  insulin lispro (HumaLOG) corrective regimen sliding scale   SubCutaneous Before meals and at bedtime  losartan 100 milliGRAM(s) Oral daily  metroNIDAZOLE  IVPB 500 milliGRAM(s) IV Intermittent every 8 hours  pantoprazole  Injectable 40 milliGRAM(s) IV Push daily  sodium chloride 0.9%. 1000 milliLiter(s) (50 mL/Hr) IV Continuous <Continuous>  tamsulosin 0.4 milliGRAM(s) Oral at bedtime    MEDICATIONS  (PRN):  dextrose 40% Gel 15 Gram(s) Oral once PRN Blood Glucose LESS THAN 70 milliGRAM(s)/deciliter  glucagon  Injectable 1 milliGRAM(s) IntraMuscular once PRN Glucose LESS THAN 70 milligrams/deciliter  HYDROmorphone  Injectable 0.5 milliGRAM(s) IV Push every 6 hours PRN Severe Pain (7 - 10)  melatonin 5 milliGRAM(s) Oral at bedtime PRN Insomnia  ondansetron Injectable 4 milliGRAM(s) IV Push every 6 hours PRN Nausea and/or Vomiting    Vital Signs Last 24 Hrs  T(C): 36.4 (2020 08:36), Max: 36.8 (2020 13:33)  T(F): 97.6 (2020 08:36), Max: 98.2 (2020 13:33)  HR: 59 (2020 08:36) (58 - 67)  BP: 146/72 (2020 08:36) (146/72 - 160/92)  BP(mean): --  RR: 17 (2020 08:36) (17 - 18)  SpO2: 95% (2020 08:36) (92% - 95%)     @ 07: @ 07:00  --------------------------------------------------------  IN: 2760 mL / OUT: 760 mL / NET: 2000 mL     @ 07: @ 10:01  --------------------------------------------------------  IN: 150 mL / OUT: 0 mL / NET: 150 mL      PHYSICAL EXAM:  General: Well developed; well nourished; in no acute distress  HEENT: MMM, conjunctiva and sclera clear  Gastrointestinal: Soft non-tender non-distended; Normal bowel sounds; No hepatosplenomegaly. No rebound or guarding  Skin: Warm and dry. No obvious rash    LABS:                        12.5   7.21  )-----------( 182      ( 2020 07:47 )             36.9         137  |  104  |  11  ----------------------------<  126<H>  4.2   |  22  |  1.01    Ca    8.5      2020 07:47  Phos  3.2       Mg     2.0                 Urinalysis Basic - ( 2020 11:05 )    Color: Yellow / Appearance: Clear / S.010 / pH: x  Gluc: x / Ketone: NEGATIVE  / Bili: Negative / Urobili: 0.2 E.U./dL   Blood: x / Protein: NEGATIVE mg/dL / Nitrite: NEGATIVE   Leuk Esterase: NEGATIVE / RBC: x / WBC x   Sq Epi: x / Non Sq Epi: x / Bacteria: x                RADIOLOGY & ADDITIONAL STUDIES: no imaging

## 2020-01-26 NOTE — DISCHARGE NOTE NURSING/CASE MANAGEMENT/SOCIAL WORK - NSDCFUADDAPPT_GEN_ALL_CORE_FT
Please follow up with Dr. Bustos next week. Call his office to schedule an appointment: [phone] Call the office if you experience increasing pain, nausea, vomiting, swelling, redness, or bright red blood per rectum, temperature >100.4F.

## 2020-01-26 NOTE — PROGRESS NOTE ADULT - SUBJECTIVE AND OBJECTIVE BOX
SUBJECTIVE: Patient examined bedside with chief resident. Patient reports he is passing flatus and having bowel movements. He reports over night he had dark red blood per rectum with bowel movements. Patient denies nausea, vomiting, chest pain and SOB. Patient making adequate urine output.      amLODIPine   Tablet 2.5 milliGRAM(s) Oral daily  ciprofloxacin   IVPB 400 milliGRAM(s) IV Intermittent every 12 hours  losartan 100 milliGRAM(s) Oral daily  metroNIDAZOLE  IVPB 500 milliGRAM(s) IV Intermittent every 8 hours  tamsulosin 0.4 milliGRAM(s) Oral at bedtime      Vital Signs Last 24 Hrs  T(C): 36.4 (26 Jan 2020 08:36), Max: 36.8 (25 Jan 2020 13:33)  T(F): 97.6 (26 Jan 2020 08:36), Max: 98.2 (25 Jan 2020 13:33)  HR: 59 (26 Jan 2020 08:36) (58 - 67)  BP: 146/72 (26 Jan 2020 08:36) (146/72 - 160/92)  BP(mean): --  RR: 17 (26 Jan 2020 08:36) (17 - 18)  SpO2: 95% (26 Jan 2020 08:36) (92% - 95%)  I&O's Detail    25 Jan 2020 07:01  -  26 Jan 2020 07:00  --------------------------------------------------------  IN:    Oral Fluid: 720 mL    sodium chloride 0.9%.: 1240 mL    Solution: 100 mL    Solution: 300 mL    Solution: 400 mL  Total IN: 2760 mL    OUT:    Voided: 760 mL  Total OUT: 760 mL    Total NET: 2000 mL      26 Jan 2020 07:01  -  26 Jan 2020 11:52  --------------------------------------------------------  IN:    sodium chloride 0.9%.: 50 mL    Solution: 100 mL  Total IN: 150 mL    OUT:  Total OUT: 0 mL    Total NET: 150 mL          General: NAD, resting comfortably in bed  C/V: NSR  Pulm: Nonlabored breathing, no respiratory distress  Abd: soft, NT/ND.  Extrem: WWP, no edema, SCDs in place      LABS:                        12.5   7.21  )-----------( 182      ( 26 Jan 2020 07:47 )             36.9     01-26    137  |  104  |  11  ----------------------------<  126<H>  4.2   |  22  |  1.01    Ca    8.5      26 Jan 2020 07:47  Phos  3.2     01-26  Mg     2.0     01-26

## 2020-01-26 NOTE — PROGRESS NOTE ADULT - ASSESSMENT
88yo M with a PMHx of HTN, HLD, DM2, CAD s/p stenting on ASA and Plavix, hx of ischemic colitis in 2014, diverticulosis, presenting with multiple episodes of diarrhea and one episode of hematochezia.    #Hematochezia   Patient with multiple episodes of diarrhea, one of which was preceded by cramping and subsequently produced hematochezia with symptoms similar to episode of ischemic colitis in 2014. CTA showing isolated left-sided colitis highly suspicious for ischemic insult with patent SMA and GARCÍA. Patient is male, but without other high-risk features. Likely an episode of moderate non-occlusive ischemic colitis.  - Maintain active T&S, large bore IV access  - Transfusion threshold per primary team  - Can d/c empiric antibiotic   - Outpatient GI followup at discharge with Dr. Louis Quinn

## 2020-01-26 NOTE — PROGRESS NOTE ADULT - REASON FOR ADMISSION
Ischaemic colitis

## 2020-01-31 DIAGNOSIS — E11.9 TYPE 2 DIABETES MELLITUS WITHOUT COMPLICATIONS: ICD-10-CM

## 2020-01-31 DIAGNOSIS — Z79.84 LONG TERM (CURRENT) USE OF ORAL HYPOGLYCEMIC DRUGS: ICD-10-CM

## 2020-01-31 DIAGNOSIS — Z98.41 CATARACT EXTRACTION STATUS, RIGHT EYE: ICD-10-CM

## 2020-01-31 DIAGNOSIS — I10 ESSENTIAL (PRIMARY) HYPERTENSION: ICD-10-CM

## 2020-01-31 DIAGNOSIS — Z95.5 PRESENCE OF CORONARY ANGIOPLASTY IMPLANT AND GRAFT: ICD-10-CM

## 2020-01-31 DIAGNOSIS — E78.5 HYPERLIPIDEMIA, UNSPECIFIED: ICD-10-CM

## 2020-01-31 DIAGNOSIS — K55.9 VASCULAR DISORDER OF INTESTINE, UNSPECIFIED: ICD-10-CM

## 2020-01-31 DIAGNOSIS — Z79.02 LONG TERM (CURRENT) USE OF ANTITHROMBOTICS/ANTIPLATELETS: ICD-10-CM

## 2020-01-31 DIAGNOSIS — Z88.0 ALLERGY STATUS TO PENICILLIN: ICD-10-CM

## 2020-01-31 DIAGNOSIS — I25.10 ATHEROSCLEROTIC HEART DISEASE OF NATIVE CORONARY ARTERY WITHOUT ANGINA PECTORIS: ICD-10-CM

## 2020-01-31 DIAGNOSIS — Z79.82 LONG TERM (CURRENT) USE OF ASPIRIN: ICD-10-CM

## 2020-01-31 DIAGNOSIS — Z98.42 CATARACT EXTRACTION STATUS, LEFT EYE: ICD-10-CM

## 2020-02-03 ENCOUNTER — OUTPATIENT (OUTPATIENT)
Dept: OUTPATIENT SERVICES | Facility: HOSPITAL | Age: 85
LOS: 1 days | End: 2020-02-03
Payer: MEDICARE

## 2020-02-03 ENCOUNTER — APPOINTMENT (OUTPATIENT)
Dept: ULTRASOUND IMAGING | Facility: HOSPITAL | Age: 85
End: 2020-02-03
Payer: MEDICARE

## 2020-02-03 DIAGNOSIS — Z95.5 PRESENCE OF CORONARY ANGIOPLASTY IMPLANT AND GRAFT: Chronic | ICD-10-CM

## 2020-02-03 DIAGNOSIS — H26.9 UNSPECIFIED CATARACT: Chronic | ICD-10-CM

## 2020-02-03 DIAGNOSIS — K40.90 UNILATERAL INGUINAL HERNIA, WITHOUT OBSTRUCTION OR GANGRENE, NOT SPECIFIED AS RECURRENT: Chronic | ICD-10-CM

## 2020-02-03 PROCEDURE — 93971 EXTREMITY STUDY: CPT

## 2020-02-03 PROCEDURE — 93971 EXTREMITY STUDY: CPT | Mod: 26,LT

## 2020-11-30 ENCOUNTER — APPOINTMENT (OUTPATIENT)
Dept: UROLOGY | Facility: CLINIC | Age: 85
End: 2020-11-30
Payer: MEDICARE

## 2020-11-30 PROCEDURE — 99214 OFFICE O/P EST MOD 30 MIN: CPT | Mod: 95

## 2020-11-30 NOTE — HISTORY OF PRESENT ILLNESS
[Other Location: e.g. School (Enter Location, City,State)___] : at [unfilled], at the time of the visit. [Other Location: e.g. Home (Enter Location, City,State)___] : at [unfilled] [Verbal consent obtained from patient] : the patient, [unfilled] [FreeTextEntry1] :  The patient-doctor relationship has been established in a face to face fashion via real time video/audio HIPAA compliant communication using telemedicine software. The patient's identity has been confirmed. The patient was previously emailed a copy of the telemedicine consent. They have had a chance to review and has now given verbal consent and has requested care to be assessed and treated through telemedicine and understands there maybe limitations in this process and they may need further follow up care in the office and or hospital settings. Patient was unable to connect using the automated [employer] platform and requested that we use an alternative platform, Face Time.\par Verbal consent given on 11/30/202, at 1:16 PM, by Miguel Angel Topete.\par \par This 87 YO M was last seen on 5/6/2019 for BPH for which he takes tamsulosin 0.8 mg with satisfactory results. At that time he described a prior UTI but has not had one in the interim. He comes today for interval checkup and med renewal. The patient has stable nocturia x 2 and chronic, stable urgency. He denies dysuria, hematuria or incontinence. HIs PSA in the past was 0.5 from May, 2014 and is no longer followed. \par \par Patient tells me that his cholesterol, gout, pre DM, CAD are all asymptomatic on his present medication regimen. He currently does not have a local PCP due to CO-V ID 19 issues. He remains allergic to PCN only. \par \par  The patient denies fevers, chills, nausea and or vomiting and no unexplained weight loss. \par All pertinent parts of the patient PFSH (past medical, family and social histories), laboratory, radiological studies and physician notes were reviewed prior to starting the face to face portion of the telemedicine visit. Questionnaire results were discussed with patient.\par

## 2020-11-30 NOTE — ASSESSMENT
[FreeTextEntry1] : We discussed continued use of the tamsulosin 0.8 mg daily and this was reordered. I also recommended that he undergo a UA, C+S and cytology at this time.PAtient will have these done at a lab near him. Finally, we discussed the benefit of having a local PCP who would be familiar with his specific health conditions in the event that he require medical care in the future. He expressed an interest in discussing this with his daughter and is aware of the benefits of having such a local PCP. We will discuss the above lab results when completed. If all remain stable, then we will plan on FU in 1 year. We ended the video portion of the visit at 1:25 PM. Fawn Chong MD\par

## 2021-07-08 ENCOUNTER — OUTPATIENT (OUTPATIENT)
Dept: OUTPATIENT SERVICES | Facility: HOSPITAL | Age: 86
LOS: 1 days | End: 2021-07-08
Payer: MEDICARE

## 2021-07-08 DIAGNOSIS — H26.9 UNSPECIFIED CATARACT: Chronic | ICD-10-CM

## 2021-07-08 DIAGNOSIS — I10 ESSENTIAL (PRIMARY) HYPERTENSION: ICD-10-CM

## 2021-07-08 DIAGNOSIS — Z95.5 PRESENCE OF CORONARY ANGIOPLASTY IMPLANT AND GRAFT: Chronic | ICD-10-CM

## 2021-07-08 DIAGNOSIS — K40.90 UNILATERAL INGUINAL HERNIA, WITHOUT OBSTRUCTION OR GANGRENE, NOT SPECIFIED AS RECURRENT: Chronic | ICD-10-CM

## 2021-07-08 DIAGNOSIS — R06.09 OTHER FORMS OF DYSPNEA: ICD-10-CM

## 2021-07-08 PROCEDURE — 93017 CV STRESS TEST TRACING ONLY: CPT

## 2021-07-08 PROCEDURE — 78452 HT MUSCLE IMAGE SPECT MULT: CPT | Mod: 26,MH

## 2021-07-08 PROCEDURE — 93016 CV STRESS TEST SUPVJ ONLY: CPT

## 2021-07-08 PROCEDURE — 93306 TTE W/DOPPLER COMPLETE: CPT

## 2021-07-08 PROCEDURE — A9505: CPT

## 2021-07-08 PROCEDURE — A9500: CPT

## 2021-07-08 PROCEDURE — 93306 TTE W/DOPPLER COMPLETE: CPT | Mod: 26

## 2021-07-08 PROCEDURE — 93018 CV STRESS TEST I&R ONLY: CPT

## 2021-07-08 PROCEDURE — 78452 HT MUSCLE IMAGE SPECT MULT: CPT

## 2022-01-27 ENCOUNTER — APPOINTMENT (OUTPATIENT)
Dept: UROLOGY | Facility: CLINIC | Age: 87
End: 2022-01-27
Payer: MEDICARE

## 2022-01-27 VITALS
HEIGHT: 69 IN | BODY MASS INDEX: 28.58 KG/M2 | HEART RATE: 66 BPM | DIASTOLIC BLOOD PRESSURE: 83 MMHG | TEMPERATURE: 98.2 F | SYSTOLIC BLOOD PRESSURE: 179 MMHG | WEIGHT: 193 LBS

## 2022-01-27 LAB
BILIRUB UR QL STRIP: NORMAL
CLARITY UR: CLEAR
COLLECTION METHOD: NORMAL
GLUCOSE UR-MCNC: NORMAL
HCG UR QL: 0.2 EU/DL
HGB UR QL STRIP.AUTO: NORMAL
KETONES UR-MCNC: NORMAL
LEUKOCYTE ESTERASE UR QL STRIP: NORMAL
NITRITE UR QL STRIP: NORMAL
PH UR STRIP: 6.5
PROT UR STRIP-MCNC: NORMAL
SP GR UR STRIP: 1.01

## 2022-01-27 PROCEDURE — 81003 URINALYSIS AUTO W/O SCOPE: CPT | Mod: QW

## 2022-01-27 PROCEDURE — 99214 OFFICE O/P EST MOD 30 MIN: CPT

## 2022-01-27 NOTE — PHYSICAL EXAM
[General Appearance - Well Developed] : well developed [General Appearance - Well Nourished] : well nourished [Normal Appearance] : normal appearance [Well Groomed] : well groomed [General Appearance - In No Acute Distress] : no acute distress [Abdomen Soft] : soft [Abdomen Tenderness] : non-tender [Costovertebral Angle Tenderness] : no ~M costovertebral angle tenderness [Prostate Tenderness] : the prostate was not tender [No Prostate Nodules] : no prostate nodules [Prostate Size ___ (0-4)] : prostate size [unfilled] (scale: 0-4) [FreeTextEntry1] : mild bipedal edema [] : no respiratory distress [Respiration, Rhythm And Depth] : normal respiratory rhythm and effort [Exaggerated Use Of Accessory Muscles For Inspiration] : no accessory muscle use [Oriented To Time, Place, And Person] : oriented to person, place, and time [Affect] : the affect was normal [Mood] : the mood was normal [Not Anxious] : not anxious [Normal Station and Gait] : the gait and station were normal for the patient's age [No Focal Deficits] : no focal deficits

## 2022-01-27 NOTE — HISTORY OF PRESENT ILLNESS
[FreeTextEntry1] : This 88 YO M was seen on 5/6/2019 for BPH for which he takes tamsulosin 0.8 mg with satisfactory results. At that time he described a prior UTI but has not had one in the interim. He comes today for interval checkup and med renewal. The patient has stable nocturia x 2 and chronic, stable urgency. He denies dysuria, hematuria or incontinence. HIs PSA in the past was 0.5 from May, 2014 and is no longer followed. \par \par Patient seen 11/30/2020 via E and told me that his cholesterol, gout, pre DM, CAD are all asymptomatic on his present medication regimen. He did not have a local PCP due to CO-V ID 19 issues. He remains allergic to PCN only. \par C+S negative\par Cytology negative.\par \par Patient seen TODAY 1/27/2022 to reassess his response. He is urinating well and frequently on the days he takes the Lasix.  He tells me that e has had pedal edema for the last 6 - 12 months. Denies any dyspnea or anginal discomfort. He is also on medication for HTN, DM, CAD.  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 \par All pertinent parts of the patient PFSH (past medical, family and social histories), laboratory, radiological studies and physician notes were reviewed prior to starting the face to face portion of the telemedicine visit. Questionnaire results were discussed with patient.\par

## 2022-01-27 NOTE — ASSESSMENT
[FreeTextEntry1] : We discussed the patient's good response to tamsulosin 0.8 mg daily, and at his request this was renewed.  Because of his concern that his edema may be related to a decrease in his renal function, I also manuel blood for BMP today.  We discussed the possibility that it is more likely related to his cardiac status and diuretic use and I recommended that he follow-up with his cardiologist concerning these issues.\par \par Because of the trace red blood cells seen on automated dipstick UA in the office today I sent urine for culture and cytology.  If these tests are negative, we will plan follow-up in 1 year.

## 2022-01-28 LAB
ANION GAP SERPL CALC-SCNC: 15 MMOL/L
BUN SERPL-MCNC: 16 MG/DL
CALCIUM SERPL-MCNC: 9.5 MG/DL
CHLORIDE SERPL-SCNC: 99 MMOL/L
CO2 SERPL-SCNC: 26 MMOL/L
CREAT SERPL-MCNC: 0.89 MG/DL
GLUCOSE SERPL-MCNC: 97 MG/DL
POTASSIUM SERPL-SCNC: 4.8 MMOL/L
SODIUM SERPL-SCNC: 139 MMOL/L
URINE CYTOLOGY: NORMAL

## 2022-01-31 LAB — BACTERIA UR CULT: NORMAL

## 2022-02-03 ENCOUNTER — NON-APPOINTMENT (OUTPATIENT)
Age: 87
End: 2022-02-03

## 2022-07-14 ENCOUNTER — EMERGENCY (EMERGENCY)
Facility: HOSPITAL | Age: 87
LOS: 1 days | Discharge: HOME | End: 2022-07-14
Attending: EMERGENCY MEDICINE
Payer: MEDICARE

## 2022-07-14 VITALS
TEMPERATURE: 99 F | RESPIRATION RATE: 18 BRPM | OXYGEN SATURATION: 96 % | DIASTOLIC BLOOD PRESSURE: 79 MMHG | HEART RATE: 78 BPM | SYSTOLIC BLOOD PRESSURE: 144 MMHG

## 2022-07-14 VITALS
TEMPERATURE: 98 F | OXYGEN SATURATION: 96 % | HEIGHT: 70.5 IN | RESPIRATION RATE: 18 BRPM | WEIGHT: 182.98 LBS | HEART RATE: 74 BPM | SYSTOLIC BLOOD PRESSURE: 155 MMHG | DIASTOLIC BLOOD PRESSURE: 91 MMHG

## 2022-07-14 DIAGNOSIS — Z95.5 PRESENCE OF CORONARY ANGIOPLASTY IMPLANT AND GRAFT: Chronic | ICD-10-CM

## 2022-07-14 DIAGNOSIS — H26.9 UNSPECIFIED CATARACT: Chronic | ICD-10-CM

## 2022-07-14 DIAGNOSIS — K40.90 UNILATERAL INGUINAL HERNIA, WITHOUT OBSTRUCTION OR GANGRENE, NOT SPECIFIED AS RECURRENT: Chronic | ICD-10-CM

## 2022-07-14 PROCEDURE — 90471 IMMUNIZATION ADMIN: CPT

## 2022-07-14 PROCEDURE — 73110 X-RAY EXAM OF WRIST: CPT

## 2022-07-14 PROCEDURE — 12013 RPR F/E/E/N/L/M 2.6-5.0 CM: CPT | Mod: GC

## 2022-07-14 PROCEDURE — 90715 TDAP VACCINE 7 YRS/> IM: CPT

## 2022-07-14 PROCEDURE — 99284 EMERGENCY DEPT VISIT MOD MDM: CPT | Mod: 25

## 2022-07-14 PROCEDURE — 70450 CT HEAD/BRAIN W/O DYE: CPT | Mod: MA

## 2022-07-14 PROCEDURE — 99284 EMERGENCY DEPT VISIT MOD MDM: CPT | Mod: 25,GC

## 2022-07-14 PROCEDURE — 73110 X-RAY EXAM OF WRIST: CPT | Mod: 26,RT

## 2022-07-14 PROCEDURE — 12013 RPR F/E/E/N/L/M 2.6-5.0 CM: CPT

## 2022-07-14 PROCEDURE — 70450 CT HEAD/BRAIN W/O DYE: CPT | Mod: 26,MA

## 2022-07-14 RX ORDER — TETANUS TOXOID, REDUCED DIPHTHERIA TOXOID AND ACELLULAR PERTUSSIS VACCINE, ADSORBED 5; 2.5; 8; 8; 2.5 [IU]/.5ML; [IU]/.5ML; UG/.5ML; UG/.5ML; UG/.5ML
0.5 SUSPENSION INTRAMUSCULAR ONCE
Refills: 0 | Status: COMPLETED | OUTPATIENT
Start: 2022-07-14 | End: 2022-07-14

## 2022-07-14 RX ORDER — ACETAMINOPHEN 500 MG
650 TABLET ORAL ONCE
Refills: 0 | Status: COMPLETED | OUTPATIENT
Start: 2022-07-14 | End: 2022-07-14

## 2022-07-14 RX ADMIN — TETANUS TOXOID, REDUCED DIPHTHERIA TOXOID AND ACELLULAR PERTUSSIS VACCINE, ADSORBED 0.5 MILLILITER(S): 5; 2.5; 8; 8; 2.5 SUSPENSION INTRAMUSCULAR at 13:37

## 2022-07-14 RX ADMIN — Medication 650 MILLIGRAM(S): at 13:37

## 2022-07-14 NOTE — ED PROVIDER NOTE - NSFOLLOWUPINSTRUCTIONS_ED_ALL_ED_FT
Today you were seen in the ED for a fall     It was found that you have a laceration which needed sutures.  Your CT scan showed ****    Please follow up with your primary care provider to have the sutures removed in 10 days. Or you can return to the ED.    SEEK IMMEDIATE MEDICAL CARE IF YOU HAVE ANY OF THE FOLLOWING SYMPTOMS: increasing redness/swelling/pain in the wound, pus coming from the wound, bad smell coming from the wound, or fever.   OR you have confusion, altered mental status, develop nausea, vomiting Today you were seen in the ED for a fall     It was found that you have a laceration which needed sutures.  Your CT scan showed no signs of a bleed, a copy of the report is attached.   You had FIVE (5) sutures placed today.     Please follow up with your primary care provider to have the sutures removed in 10 days. Or you can return to the ED.    SEEK IMMEDIATE MEDICAL CARE IF YOU HAVE ANY OF THE FOLLOWING SYMPTOMS: increasing redness/swelling/pain in the wound, pus coming from the wound, bad smell coming from the wound, or fever.   OR you have confusion, altered mental status, develop nausea, vomiting

## 2022-07-14 NOTE — ED PROVIDER NOTE - NSICDXPASTSURGICALHX_GEN_ALL_CORE_FT
PAST SURGICAL HISTORY:  Cataracts, bilateral prior extraction    Hernia, inguinal, right repair    Stented coronary artery 2009

## 2022-07-14 NOTE — ED ADULT NURSE NOTE - NSIMPLEMENTINTERV_GEN_ALL_ED
Implemented All Fall Risk Interventions:  [place] to call system. Call bell, personal items and telephone within reach. Instruct patient to call for assistance. Room bathroom lighting operational. Non-slip footwear when patient is off stretcher. Physically safe environment: no spills, clutter or unnecessary equipment. Stretcher in lowest position, wheels locked, appropriate side rails in place. Provide visual cue, wrist band, yellow gown, etc. Monitor gait and stability. Monitor for mental status changes and reorient to person, place, and time. Review medications for side effects contributing to fall risk. Reinforce activity limits and safety measures with patient and family.

## 2022-07-14 NOTE — ED PROVIDER NOTE - PHYSICAL EXAMINATION
GENERAL: Awake, alert, NAD  HEENT: NC/AT, moist mucous membranes, PERRL, EOMI left above eyebrow horizontal lac 3cm approx  no facial tenderness no blood on palate no c spine tenderness   ABDOMEN: Soft, , non tender, non distended, no rebound, no guarding  BACK: No midline spinal tenderness,   EXT: Nontender b/l snuff box, ecchymosis over R snuff box otherwise full wrist ROM and elbow   NEURO: A&Ox3. Moving all extremities.  SKIN: Warm and dry. No rash.  PSYCH: Normal affect.

## 2022-07-14 NOTE — ED PROVIDER NOTE - PATIENT PORTAL LINK FT
You can access the FollowMyHealth Patient Portal offered by [facility] by registering at the following website: [email or web address]. By joining [facility]’s FollowMyHealth portal, you will also be able to view your health information using other applications (apps) compatible with our system.

## 2022-07-14 NOTE — ED PROVIDER NOTE - OBJECTIVE STATEMENT
89M Prior stents on plavix s/p fall. Pt was walking outside tripped onto cement no loc, fell towards left side struck head. PT BIBEMS w/ active bleed. PT endorsing pain near lac site otherwise no pain, no n/v since accident.   PT unsure as to when last tdap shot was

## 2022-07-14 NOTE — ED PROVIDER NOTE - CLINICAL SUMMARY MEDICAL DECISION MAKING FREE TEXT BOX
89M s/p fall on plavix w/ lac given hx and physical concern for brain bleed will get ct head 89M s/p fall on plavix w/ lac given hx and physical concern for brain bleed will get ct head    Jona: 89 year old male s/p fall on plavix.  here with laceration over left eyebrow.  will update tetanus, laceration repair, ct scans. no focal deficits, eomi, peerla, 5/5 b/l ue/le. normal gait.

## 2022-07-14 NOTE — ED ADULT NURSE NOTE - OBJECTIVE STATEMENT
89yo M aaox4 h/o x3 cardiac stents on plavix and ASA /daily, presents to ED via EMS c/o laceration s/p fall, as per pt today he was walking outside, then had a trip/fall, falling on his L side body hitting the head causing laceration on the L eyebrow, and ecchymosis on the R hand, Pt denies CP, SOB, HA, vision changes, n/v/d, fevers chills, Safety and comfort measures initiated- bed placed in lowest position and side rails raised.

## 2022-07-14 NOTE — ED PROVIDER NOTE - BIRTH SEX
Drysol Counseling:  I discussed with the patient the risks of drysol/aluminum chloride including but not limited to skin rash, itching, irritation, burning. Male

## 2023-01-09 ENCOUNTER — RX RENEWAL (OUTPATIENT)
Age: 88
End: 2023-01-09

## 2023-01-27 ENCOUNTER — APPOINTMENT (OUTPATIENT)
Dept: UROLOGY | Facility: CLINIC | Age: 88
End: 2023-01-27

## 2023-04-13 ENCOUNTER — NON-APPOINTMENT (OUTPATIENT)
Age: 88
End: 2023-04-13

## 2023-04-19 ENCOUNTER — RX RENEWAL (OUTPATIENT)
Age: 88
End: 2023-04-19

## 2023-04-27 ENCOUNTER — APPOINTMENT (OUTPATIENT)
Dept: UROLOGY | Facility: CLINIC | Age: 88
End: 2023-04-27
Payer: MEDICARE

## 2023-04-27 LAB
BILIRUB UR QL STRIP: NORMAL
CLARITY UR: CLEAR
COLLECTION METHOD: NORMAL
GLUCOSE UR-MCNC: NORMAL
HCG UR QL: 0.2 EU/DL
HGB UR QL STRIP.AUTO: NORMAL
KETONES UR-MCNC: NORMAL
LEUKOCYTE ESTERASE UR QL STRIP: NORMAL
NITRITE UR QL STRIP: NORMAL
PH UR STRIP: 5.5
PROT UR STRIP-MCNC: NORMAL
SP GR UR STRIP: 1.01

## 2023-04-27 PROCEDURE — 76857 US EXAM PELVIC LIMITED: CPT

## 2023-04-27 PROCEDURE — 99213 OFFICE O/P EST LOW 20 MIN: CPT | Mod: 25

## 2023-04-27 PROCEDURE — 81003 URINALYSIS AUTO W/O SCOPE: CPT | Mod: QW

## 2023-04-27 NOTE — ASSESSMENT
[FreeTextEntry1] : We discussed the findings of his pelvic sonogram, and he is objectively emptying his bladder well. We discussed his recent lab results, indicating normal renal function. The lower extremity edema appeared to be cardiac related, and is now under control.  He will continue on the tamsulosin BID for his stable voiding symptoms. If there are no new problems, he will follow up in 6 months. Fawn Chong MD\par

## 2023-04-27 NOTE — PHYSICAL EXAM
[General Appearance - Well Developed] : well developed [General Appearance - Well Nourished] : well nourished [Normal Appearance] : normal appearance [Well Groomed] : well groomed [General Appearance - In No Acute Distress] : no acute distress [Urethral Meatus] : meatus normal [Urinary Bladder Findings] : the bladder was normal on palpation [Scrotum] : the scrotum was normal [Testes Mass (___cm)] : there were no testicular masses [No Prostate Nodules] : no prostate nodules [Oriented To Time, Place, And Person] : oriented to person, place, and time [Affect] : the affect was normal [Mood] : the mood was normal [Not Anxious] : not anxious [Prostate Tenderness] : the prostate was not tender [Prostate Size ___ gm] : prostate size [unfilled] gm

## 2023-04-27 NOTE — LETTER BODY
[Dear  ___] : Dear  [unfilled], [Courtesy Letter:] : I had the pleasure of seeing your patient, [unfilled], in my office today. [Please see my note below.] : Please see my note below. [Consult Closing:] : Thank you very much for allowing me to participate in the care of this patient.  If you have any questions, please do not hesitate to contact me. [FreeTextEntry3] : Best Regards, \par \par Fawn Chong MD\par

## 2023-04-27 NOTE — HISTORY OF PRESENT ILLNESS
[FreeTextEntry1] : 89 YO M seen on 5/6/2019 for BPH for which he takes tamsulosin 0.8 mg with satisfactory results. At that time he described a prior UTI but has not had one in the interim. He comes today for interval checkup and med renewal. The patient has stable nocturia x 2 and chronic, stable urgency. He denies dysuria, hematuria or incontinence. HIs PSA in the past was 0.5 from May, 2014 and is no longer followed. \par \par Patient seen 11/30/2020 via E and told me that his cholesterol, gout, pre DM, CAD are all asymptomatic on his present medication regimen. He did not have a local PCP due to CO-V ID 19 issues. He remains allergic to PCN only. \par C+S negative\par Cytology negative.\par \par Patient seen  1/27/2022 to reassess his response. He is urinating well and frequently on the days he takes the Lasix.  He tells me that he has had pedal edema for the last 6 - 12 months. Denies any dyspnea or anginal discomfort. He is also on medication for HTN, DM, CAD.  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 Because of his concern that his edema may be related to a decrease in his renal function, I also manuel blood for BMP today. We discussed the possibility that it is more likely related to his cardiac status and diuretic use and I recommended that he follow-up with his cardiologist concerning these issues.\par Because of the trace red blood cells seen on automated dipstick UA in the office today I sent urine for culture and cytology. If these tests are negative, we will plan follow-up in 1 year. \par BMP: BUN 16, CRE 0.89\par C+S, cytology both negative.\par \par Patient seen TODAY 4/27/2023 due to feeling of not emptying. He is accompanied by his daughter. He was recently evaluated by his cardiologist Dr. Bee for increased lower extremity edema who readjusted his furosemide dose. Since that time, the lower extremity swelling has improved. He continues on the tamsulosin BID. Recent lab work done, normal kidney function (see below).\par \par CMP on 4/1923\par BUN 27\par serum creatinine 1.03\par \par UA negative\par IPSS 16\par pelvic sono: PVR 1 cc, prostate 43 cc\par \par \par \par  The patient denies fevers, chills, nausea and or vomiting and no unexplained weight loss. \par All pertinent parts of the patient PFSH (past medical, family and social histories), laboratory, radiological studies and physician notes were reviewed prior to starting the face to face portion of the telemedicine visit. Questionnaire results were discussed with patient.\par

## 2023-09-30 ENCOUNTER — RX RENEWAL (OUTPATIENT)
Age: 88
End: 2023-09-30

## 2023-12-27 ENCOUNTER — APPOINTMENT (OUTPATIENT)
Dept: UROLOGY | Facility: CLINIC | Age: 88
End: 2023-12-27
Payer: MEDICARE

## 2023-12-27 LAB
BILIRUB UR QL STRIP: NORMAL
CLARITY UR: CLEAR
COLLECTION METHOD: NORMAL
GLUCOSE UR-MCNC: NORMAL
HCG UR QL: 0.2 EU/DL
HGB UR QL STRIP.AUTO: NORMAL
KETONES UR-MCNC: NORMAL
LEUKOCYTE ESTERASE UR QL STRIP: NORMAL
NITRITE UR QL STRIP: NORMAL
PH UR STRIP: 6
PROT UR STRIP-MCNC: NORMAL
SP GR UR STRIP: 1.01

## 2023-12-27 PROCEDURE — 99213 OFFICE O/P EST LOW 20 MIN: CPT

## 2023-12-27 PROCEDURE — 51798 US URINE CAPACITY MEASURE: CPT

## 2023-12-27 RX ORDER — TAMSULOSIN HYDROCHLORIDE 0.4 MG/1
0.4 CAPSULE ORAL
Qty: 180 | Refills: 0 | Status: ACTIVE | COMMUNITY
Start: 2020-11-25 | End: 1900-01-01

## 2023-12-27 NOTE — PHYSICAL EXAM
[General Appearance - Well Developed] : well developed [General Appearance - Well Nourished] : well nourished [Normal Appearance] : normal appearance [Well Groomed] : well groomed [General Appearance - In No Acute Distress] : no acute distress [Abdomen Soft] : soft [Prostate Tenderness] : the prostate was not tender [No Prostate Nodules] : no prostate nodules [Prostate Size ___ (0-4)] : prostate size [unfilled] (scale: 0-4) [Oriented To Time, Place, And Person] : oriented to person, place, and time [Affect] : the affect was normal [Mood] : the mood was normal [Not Anxious] : not anxious

## 2023-12-27 NOTE — ASSESSMENT
[FreeTextEntry1] : Physical exam today is stable and tamsulosin was renewed for a year.  Follow-up in 1 year or 6 months if the patient desires.  In the meantime urine sent for culture today.  We will treat symptomatic UTIs only. Fawn Chong MD

## 2023-12-27 NOTE — LETTER BODY
[Dear  ___] : Dear  [unfilled], [Courtesy Letter:] : I had the pleasure of seeing your patient, [unfilled], in my office today. [Please see my note below.] : Please see my note below. [Consult Closing:] : Thank you very much for allowing me to participate in the care of this patient.  If you have any questions, please do not hesitate to contact me. [FreeTextEntry3] : Best Regards,   Fawn Chong MD

## 2023-12-27 NOTE — HISTORY OF PRESENT ILLNESS
[FreeTextEntry1] : 89 YO M seen on 5/6/2019 for BPH for which he takes tamsulosin 0.8 mg with satisfactory results. At that time he described a prior UTI but has not had one in the interim. He comes today for interval checkup and med renewal. The patient has stable nocturia x 2 and chronic, stable urgency. He denies dysuria, hematuria or incontinence. HIs PSA in the past was 0.5 from May, 2014 and is no longer followed.   Patient seen 11/30/2020 via E and told me that his cholesterol, gout, pre DM, CAD are all asymptomatic on his present medication regimen. He did not have a local PCP due to CO-V ID 19 issues. He remains allergic to PCN only.  C+S negative Cytology negative.  Patient seen  1/27/2022 to reassess his response. He is urinating well and frequently on the days he takes the Lasix.  He tells me that he has had pedal edema for the last 6 - 12 months. Denies any dyspnea or anginal discomfort. He is also on medication for HTN, DM, CAD.  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 UA trace RBC IPSS 13 PVR 2 cc. We discussed the patient's good response to tamsulosin 0.8 mg daily, and at his request this was renewed. Because of his concern that his edema may be related to a decrease in his renal function, I also manuel blood for BMP today. We discussed the possibility that it is more likely related to his cardiac status and diuretic use and I recommended that he follow-up with his cardiologist concerning these issues. Because of the trace red blood cells seen on automated dipstick UA in the office today I sent urine for culture and cytology. If these tests are negative, we will plan follow-up in 1 year.  BMP: BUN 16, CRE 0.89 C+S, cytology both negative.  Patient seen TODAY 4/27/2023 due to feeling of not emptying. He is accompanied by his daughter. He was recently evaluated by his cardiologist Dr. Bee for increased lower extremity edema who readjusted his furosemide dose. Since that time, the lower extremity swelling has improved. He continues on the tamsulosin BID. Recent lab work done, normal kidney function (see below). CMP on 4/1923 BUN 27 serum creatinine 1.03 UA negative IPSS 16 pelvic sono: PVR 1 cc, prostate 43 cc The patient denies fevers, chills, nausea and or vomiting and no unexplained weight loss.  All pertinent parts of the patient PFSH (past medical, family and social histories), laboratory, radiological studies and physician notes were reviewed prior to starting the face to face portion of the telemedicine visit. Questionnaire results were discussed with patient. We discussed the findings of his pelvic sonogram, and he is objectively emptying his bladder well. We discussed his recent lab results, indicating normal renal function. The lower extremity edema appeared to be cardiac related, and is now under control. He will continue on the tamsulosin BID for his stable voiding symptoms. If there are no new problems, he will follow up in 6 months.  Patient seen TODAY 12/27/2023 to reassess. He is urinating well on the tamsulosin 0.8 mg and requests a refill.  He has nocturia x 1-2, but some daytime frequency. We discussed the use of a portable urinal at the bedside, but he prefers to go into the bathroom.  UA small WBC IPSS 15 PVR 8ml.

## 2024-01-03 LAB — BACTERIA UR CULT: ABNORMAL

## 2024-02-04 ENCOUNTER — NON-APPOINTMENT (OUTPATIENT)
Age: 89
End: 2024-02-04

## 2024-02-05 ENCOUNTER — NON-APPOINTMENT (OUTPATIENT)
Age: 89
End: 2024-02-05

## 2024-02-05 ENCOUNTER — LABORATORY RESULT (OUTPATIENT)
Age: 89
End: 2024-02-05

## 2024-02-06 ENCOUNTER — NON-APPOINTMENT (OUTPATIENT)
Age: 89
End: 2024-02-06

## 2024-02-06 RX ORDER — TAMSULOSIN HYDROCHLORIDE 0.4 MG/1
0.4 CAPSULE ORAL
Qty: 180 | Refills: 3 | Status: ACTIVE | COMMUNITY
Start: 2024-02-06 | End: 1900-01-01

## 2024-02-08 ENCOUNTER — APPOINTMENT (OUTPATIENT)
Dept: UROLOGY | Facility: CLINIC | Age: 89
End: 2024-02-08
Payer: MEDICARE

## 2024-02-08 DIAGNOSIS — N13.8 BENIGN PROSTATIC HYPERPLASIA WITH LOWER URINARY TRACT SYMPMS: ICD-10-CM

## 2024-02-08 DIAGNOSIS — R30.0 DYSURIA: ICD-10-CM

## 2024-02-08 DIAGNOSIS — N40.1 BENIGN PROSTATIC HYPERPLASIA WITH LOWER URINARY TRACT SYMPMS: ICD-10-CM

## 2024-02-08 PROCEDURE — 81003 URINALYSIS AUTO W/O SCOPE: CPT | Mod: QW

## 2024-02-08 PROCEDURE — 99213 OFFICE O/P EST LOW 20 MIN: CPT

## 2024-02-08 PROCEDURE — 51798 US URINE CAPACITY MEASURE: CPT

## 2024-02-08 NOTE — ASSESSMENT
[FreeTextEntry1] : We reviewed evaluation from today including the normal PVR and urinalysis.  We will repeat urine culture today.  I asked the patient to contact me if his symptoms return, otherwise follow-up 6 months Fawn Chong MD

## 2024-02-08 NOTE — PHYSICAL EXAM
[General Appearance - Well Developed] : well developed [General Appearance - Well Nourished] : well nourished [Normal Appearance] : normal appearance [Well Groomed] : well groomed [General Appearance - In No Acute Distress] : no acute distress [Abdomen Soft] : soft [Abdomen Mass (___ Cm)] : no abdominal mass palpated [Costovertebral Angle Tenderness] : no ~M costovertebral angle tenderness [Epididymis] : the epididymides were normal [Testes Tenderness] : no tenderness of the testes [Testes Mass (___cm)] : there were no testicular masses [Oriented To Time, Place, And Person] : oriented to person, place, and time [Affect] : the affect was normal [Mood] : the mood was normal [Not Anxious] : not anxious

## 2024-02-08 NOTE — HISTORY OF PRESENT ILLNESS
[FreeTextEntry1] : 89 YO M seen on 5/6/2019 for BPH for which he takes tamsulosin 0.8 mg with satisfactory results. At that time he described a prior UTI but has not had one in the interim. He comes today for interval checkup and med renewal. The patient has stable nocturia x 2 and chronic, stable urgency. He denies dysuria, hematuria or incontinence. HIs PSA in the past was 0.5 from May, 2014 and is no longer followed.   Patient seen 11/30/2020 via E and told me that his cholesterol, gout, pre DM, CAD are all asymptomatic on his present medication regimen. He did not have a local PCP due to CO-V ID 19 issues. He remains allergic to PCN only.  C+S negative Cytology negative.  Patient seen  1/27/2022 to reassess his response. He is urinating well and frequently on the days he takes the Lasix.  He tells me that he has had pedal edema for the last 6 - 12 months. Denies any dyspnea or anginal discomfort. He is also on medication for HTN, DM, CAD.  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 UA trace RBC IPSS 13 PVR 2 cc. We discussed the patient's good response to tamsulosin 0.8 mg daily, and at his request this was renewed. Because of his concern that his edema may be related to a decrease in his renal function, I also manuel blood for BMP today. We discussed the possibility that it is more likely related to his cardiac status and diuretic use and I recommended that he follow-up with his cardiologist concerning these issues. Because of the trace red blood cells seen on automated dipstick UA in the office today I sent urine for culture and cytology. If these tests are negative, we will plan follow-up in 1 year.  BMP: BUN 16, CRE 0.89 C+S, cytology both negative.  Patient seen 4/27/2023 due to feeling of not emptying. He is accompanied by his daughter. He was recently evaluated by his cardiologist Dr. Bee for increased lower extremity edema who readjusted his furosemide dose. Since that time, the lower extremity swelling has improved. He continues on the tamsulosin BID. Recent lab work done, normal kidney function (see below). CMP on 4/1923 BUN 27 serum creatinine 1.03 UA negative IPSS 16 pelvic sono: PVR 1 cc, prostate 43 cc The patient denies fevers, chills, nausea and or vomiting and no unexplained weight loss.  All pertinent parts of the patient PFSH (past medical, family and social histories), laboratory, radiological studies and physician notes were reviewed prior to starting the face to face portion of the telemedicine visit. Questionnaire results were discussed with patient. We discussed the findings of his pelvic sonogram, and he is objectively emptying his bladder well. We discussed his recent lab results, indicating normal renal function. The lower extremity edema appeared to be cardiac related, and is now under control. He will continue on the tamsulosin BID for his stable voiding symptoms. If there are no new problems, he will follow up in 6 months.  Patient seen 12/27/2023 to reassess. He is urinating well on the tamsulosin 0.8 mg and requests a refill.  He has nocturia x 1-2, but some daytime frequency. We discussed the use of a portable urinal at the bedside, but he prefers to go into the bathroom.  UA small WBC IPSS 15 PVR 8ml. Physical exam today is stable and tamsulosin was renewed for a year. Follow-up in 1 year or 6 months if the patient desires. In the meantime urine sent for culture today. We will treat symptomatic C+S >100K Enterococcus, S to all but TCN. Not symptomatic so no treatment indicated.   2/4/2024  Called office with marked dysuria, started on Cipro by covering MD [place].  2/7/2024 Staff spoke with daughter, Emilie. Urine Cx (2/5/2024): No growth; verified by Dr. Chogn. Advised patient to discontinue the Cipro. Mr. Uribe will call the office today to schedule a visit with Dr. Chong this week. Mr. Uribe reports less dysuria since starting the Cipro; he prefers to continue taking the antibiotic. R flank pain has resolved.  will notify patient if there is an available appointment on 2/8/2024 with Dr. Chong.  Patient seen TODAY 2/9/2024 to reassess. He told me that he feels better and is urinating better since taking the Cipro, last dose today. UA negative PVR 38

## 2024-02-15 LAB — BACTERIA UR CULT: NORMAL

## 2024-04-25 ENCOUNTER — APPOINTMENT (OUTPATIENT)
Dept: HEART AND VASCULAR | Facility: CLINIC | Age: 89
End: 2024-04-25

## 2024-08-08 ENCOUNTER — NON-APPOINTMENT (OUTPATIENT)
Age: 89
End: 2024-08-08

## 2025-02-19 NOTE — ED ADULT NURSE NOTE - NSFALLRSKINDICTYPE_ED_ALL_ED
Phone call placed to patient's spouse, Mariam, in follow-up to social work referral from Eileen Vincent CNP. Left voicemail welcoming return call for social work services.    Need for Mobility Assisted Device

## 2025-03-25 NOTE — H&P ADULT - I WAS PHYSICALLY PRESENT FOR THE KEY PORTIONS OF THE EVALUATION AND MANAGEMENT (E/M) SERVICE PROVIDED.  I AGREE WITH THE ABOVE HISTORY, PHYSICAL, AND PLAN WHICH I HAVE REVIEWED AND EDITED WHERE APPROPRIATE
Attempted to call patient, unable to get through  
Order updated, was from 2024. Should be ok to schedule now. Called patient, no answer or vm available.   
Patient called [facility] office stating when she tried to schedule her MRI she was told there was no order in her chart. Could another order be placed for patient to get MRI of bilateral shoulders? Please advise, thank you!   
Statement Selected

## 2025-04-23 ENCOUNTER — APPOINTMENT (OUTPATIENT)
Dept: UROLOGY | Facility: CLINIC | Age: 89
End: 2025-04-23
Payer: MEDICARE

## 2025-04-23 ENCOUNTER — LABORATORY RESULT (OUTPATIENT)
Age: 89
End: 2025-04-23

## 2025-04-23 DIAGNOSIS — N40.1 BENIGN PROSTATIC HYPERPLASIA WITH LOWER URINARY TRACT SYMPMS: ICD-10-CM

## 2025-04-23 DIAGNOSIS — N13.8 BENIGN PROSTATIC HYPERPLASIA WITH LOWER URINARY TRACT SYMPMS: ICD-10-CM

## 2025-04-23 DIAGNOSIS — R30.0 DYSURIA: ICD-10-CM

## 2025-04-23 PROCEDURE — 51798 US URINE CAPACITY MEASURE: CPT

## 2025-04-23 PROCEDURE — 99214 OFFICE O/P EST MOD 30 MIN: CPT

## 2025-04-24 LAB
BILIRUB UR QL STRIP: NORMAL
CLARITY UR: CLEAR
GLUCOSE UR-MCNC: NORMAL
HCG UR QL: 0.2 EU/DL
HGB UR QL STRIP.AUTO: NORMAL
KETONES UR-MCNC: NORMAL
LEUKOCYTE ESTERASE UR QL STRIP: NORMAL
NITRITE UR QL STRIP: NORMAL
PH UR STRIP: 5.5
PROT UR STRIP-MCNC: NORMAL
SP GR UR STRIP: 1.01

## 2025-04-29 LAB — URINE CYTOLOGY: NORMAL

## 2025-08-06 ENCOUNTER — NON-APPOINTMENT (OUTPATIENT)
Age: 89
End: 2025-08-06

## 2025-08-14 ENCOUNTER — RX RENEWAL (OUTPATIENT)
Age: 89
End: 2025-08-14

## 2025-08-14 ENCOUNTER — APPOINTMENT (OUTPATIENT)
Dept: UROLOGY | Facility: CLINIC | Age: 89
End: 2025-08-14
Payer: MEDICARE

## 2025-08-14 VITALS
OXYGEN SATURATION: 97 % | DIASTOLIC BLOOD PRESSURE: 76 MMHG | SYSTOLIC BLOOD PRESSURE: 148 MMHG | TEMPERATURE: 97.6 F | HEART RATE: 78 BPM

## 2025-08-14 DIAGNOSIS — Z00.00 ENCOUNTER FOR GENERAL ADULT MEDICAL EXAMINATION W/OUT ABNORMAL FINDINGS: ICD-10-CM

## 2025-08-14 LAB
BILIRUB UR QL STRIP: NORMAL
CLARITY UR: CLEAR
COLLECTION METHOD: NORMAL
GLUCOSE UR-MCNC: NORMAL
HCG UR QL: 0.2 EU/DL
HGB UR QL STRIP.AUTO: NORMAL
KETONES UR-MCNC: NORMAL
LEUKOCYTE ESTERASE UR QL STRIP: NORMAL
NITRITE UR QL STRIP: NORMAL
PH UR STRIP: 5
PROT UR STRIP-MCNC: NORMAL
SP GR UR STRIP: 1.01

## 2025-08-14 PROCEDURE — 99215 OFFICE O/P EST HI 40 MIN: CPT

## 2025-08-16 ENCOUNTER — TRANSCRIPTION ENCOUNTER (OUTPATIENT)
Age: 89
End: 2025-08-16

## 2025-08-16 LAB — BACTERIA UR CULT: NORMAL

## 2025-08-22 ENCOUNTER — RX RENEWAL (OUTPATIENT)
Age: 89
End: 2025-08-22

## 2025-08-22 RX ORDER — FINASTERIDE 5 MG/1
5 TABLET, FILM COATED ORAL DAILY
Qty: 90 | Refills: 3 | Status: ACTIVE | COMMUNITY
Start: 2025-08-14 | End: 1900-01-01

## 2025-09-05 ENCOUNTER — RX RENEWAL (OUTPATIENT)
Age: 89
End: 2025-09-05

## 2025-09-09 ENCOUNTER — RX RENEWAL (OUTPATIENT)
Age: 89
End: 2025-09-09